# Patient Record
Sex: FEMALE | Race: WHITE | NOT HISPANIC OR LATINO | Employment: OTHER | URBAN - METROPOLITAN AREA
[De-identification: names, ages, dates, MRNs, and addresses within clinical notes are randomized per-mention and may not be internally consistent; named-entity substitution may affect disease eponyms.]

---

## 2017-05-01 ENCOUNTER — HISTORICAL (OUTPATIENT)
Dept: LAB | Facility: HOSPITAL | Age: 67
End: 2017-05-01

## 2017-05-01 LAB
ABS NEUT (OLG): 4.7 X10(3)/MCL (ref 2.1–9.2)
ALBUMIN SERPL-MCNC: 3.9 GM/DL (ref 3.4–5)
ALBUMIN/GLOB SERPL: 1.2 RATIO (ref 1.1–2)
ALP SERPL-CCNC: 80 UNIT/L (ref 46–116)
ALT SERPL-CCNC: 45 UNIT/L (ref 12–78)
APPEARANCE, UA: ABNORMAL
AST SERPL-CCNC: 24 UNIT/L (ref 15–37)
BACTERIA SPEC CULT: ABNORMAL /HPF
BASOPHILS NFR BLD AUTO: 0 % (ref 0–2)
BILIRUB SERPL-MCNC: 0.6 MG/DL (ref 0.2–1)
BILIRUB UR QL STRIP: NEGATIVE
BILIRUBIN DIRECT+TOT PNL SERPL-MCNC: 0.16 MG/DL (ref 0–0.2)
BILIRUBIN DIRECT+TOT PNL SERPL-MCNC: 0.44 MG/DL (ref 0–0.8)
BNP BLD-MCNC: 55 PG/ML (ref 0–125)
BUN SERPL-MCNC: 17 MG/DL (ref 7–18)
CALCIUM SERPL-MCNC: 9.7 MG/DL (ref 8.5–10.1)
CHLORIDE SERPL-SCNC: 103 MMOL/L (ref 98–107)
CHOLEST SERPL-MCNC: 200 MG/DL (ref 0–200)
CHOLEST/HDLC SERPL: 3 {RATIO} (ref 0–4)
CO2 SERPL-SCNC: 34.4 MMOL/L (ref 21–32)
COLOR UR: ABNORMAL
CREAT SERPL-MCNC: 1.11 MG/DL (ref 0.6–1.3)
DEPRECATED CALCIDIOL+CALCIFEROL SERPL-MC: 17.53 NG/ML (ref 30–80)
EOSINOPHIL # BLD AUTO: 0.2 X10(3)/MCL
EOSINOPHIL NFR BLD AUTO: 3 %
ERYTHROCYTE [DISTWIDTH] IN BLOOD BY AUTOMATED COUNT: 15.7 % (ref 11.5–17)
GLOBULIN SER-MCNC: 3.3 GM/DL (ref 2.4–3.5)
GLUCOSE (UA): NEGATIVE
GLUCOSE SERPL-MCNC: 123 MG/DL (ref 74–106)
HCT VFR BLD AUTO: 37.1 % (ref 37–47)
HDLC SERPL-MCNC: 66 MG/DL (ref 40–60)
HGB BLD-MCNC: 12.2 GM/DL (ref 12–16)
HGB UR QL STRIP: NEGATIVE
KETONES UR QL STRIP: NEGATIVE
LDLC SERPL CALC-MCNC: 108 MG/DL (ref 0–129)
LEUKOCYTE ESTERASE UR QL STRIP: NEGATIVE
LYMPHOCYTES # BLD AUTO: 1.6 X10(3)/MCL
LYMPHOCYTES NFR BLD AUTO: 22 % (ref 13–40)
MCH RBC QN AUTO: 28.3 PG (ref 27–31)
MCHC RBC AUTO-ENTMCNC: 32.8 GM/DL (ref 33–36)
MCV RBC AUTO: 86.2 FL (ref 80–94)
MONOCYTES # BLD AUTO: 0.8 X10(3)/MCL
MONOCYTES NFR BLD AUTO: 11 % (ref 2–11)
NEUTROPHILS # BLD AUTO: 4.7 X10(3)/MCL (ref 2.1–9.2)
NEUTROPHILS NFR BLD AUTO: 63 % (ref 47–80)
NITRITE UR QL STRIP: NEGATIVE
PH UR STRIP: 7 [PH] (ref 5–9)
PLATELET # BLD AUTO: 271 X10(3)/MCL (ref 130–400)
PMV BLD AUTO: 9.4 FL (ref 7.4–10.4)
POTASSIUM SERPL-SCNC: 4.2 MMOL/L (ref 3.5–5.1)
PROT SERPL-MCNC: 7.2 GM/DL (ref 6.4–8.2)
PROT UR QL STRIP: ABNORMAL
RBC # BLD AUTO: 4.31 X10(6)/MCL (ref 4.2–5.4)
RBC #/AREA URNS HPF: ABNORMAL /[HPF]
SODIUM SERPL-SCNC: 144 MMOL/L (ref 136–145)
SP GR UR STRIP: 1.02 (ref 1–1.03)
SQUAMOUS EPITHELIAL, UA: 26 /HPF (ref 0–4)
TRIGL SERPL-MCNC: 131 MG/DL
TSH SERPL-ACNC: 0.51 MIU/ML (ref 0.36–3.74)
UROBILINOGEN UR STRIP-ACNC: 1
VLDLC SERPL CALC-MCNC: 26 MG/DL
WBC # SPEC AUTO: 7.3 X10(3)/MCL (ref 4.5–11.5)
WBC #/AREA URNS HPF: ABNORMAL /[HPF]

## 2017-05-03 LAB — FINAL CULTURE: NORMAL

## 2017-06-02 ENCOUNTER — HISTORICAL (OUTPATIENT)
Dept: LAB | Facility: HOSPITAL | Age: 67
End: 2017-06-02

## 2017-06-02 LAB
BUN SERPL-MCNC: 17 MG/DL (ref 7–18)
CALCIUM SERPL-MCNC: 9.5 MG/DL (ref 8.5–10.1)
CHLORIDE SERPL-SCNC: 102 MMOL/L (ref 98–107)
CO2 SERPL-SCNC: 31.7 MMOL/L (ref 21–32)
CREAT SERPL-MCNC: 1.77 MG/DL (ref 0.6–1.3)
GLUCOSE SERPL-MCNC: 134 MG/DL (ref 74–106)
POTASSIUM SERPL-SCNC: 3.5 MMOL/L (ref 3.5–5.1)
SODIUM SERPL-SCNC: 141 MMOL/L (ref 136–145)

## 2017-06-15 ENCOUNTER — HISTORICAL (OUTPATIENT)
Dept: LAB | Facility: HOSPITAL | Age: 67
End: 2017-06-15

## 2017-06-15 LAB
BUN SERPL-MCNC: 13 MG/DL (ref 7–18)
CALCIUM SERPL-MCNC: 9.3 MG/DL (ref 8.5–10.1)
CHLORIDE SERPL-SCNC: 104 MMOL/L (ref 98–107)
CO2 SERPL-SCNC: 35 MMOL/L (ref 21–32)
CREAT SERPL-MCNC: 1.13 MG/DL (ref 0.6–1.3)
GLUCOSE SERPL-MCNC: 117 MG/DL (ref 74–106)
POTASSIUM SERPL-SCNC: 4.6 MMOL/L (ref 3.5–5.1)
SODIUM SERPL-SCNC: 144 MMOL/L (ref 136–145)

## 2017-07-28 ENCOUNTER — HISTORICAL (OUTPATIENT)
Dept: LAB | Facility: HOSPITAL | Age: 67
End: 2017-07-28

## 2017-07-28 LAB
ABS NEUT (OLG): 3.41 X10(3)/MCL (ref 2.1–9.2)
APTT PPP: 29.7 SECOND(S) (ref 20.6–36)
BASOPHILS # BLD AUTO: 0.1 X10(3)/MCL (ref 0–0.2)
BASOPHILS NFR BLD AUTO: 1 %
BUN SERPL-MCNC: 11 MG/DL (ref 7–18)
CALCIUM SERPL-MCNC: 9.7 MG/DL (ref 8.5–10.1)
CHLORIDE SERPL-SCNC: 102 MMOL/L (ref 98–107)
CO2 SERPL-SCNC: 31 MMOL/L (ref 21–32)
CREAT SERPL-MCNC: 1.21 MG/DL (ref 0.55–1.02)
EOSINOPHIL # BLD AUTO: 0.2 X10(3)/MCL (ref 0–0.9)
EOSINOPHIL NFR BLD AUTO: 3 %
ERYTHROCYTE [DISTWIDTH] IN BLOOD BY AUTOMATED COUNT: 13.7 % (ref 11.5–17)
GLUCOSE SERPL-MCNC: 159 MG/DL (ref 74–106)
HCT VFR BLD AUTO: 41.9 % (ref 37–47)
HGB BLD-MCNC: 13.1 GM/DL (ref 12–16)
INR PPP: 1.23 (ref 0–1.27)
LYMPHOCYTES # BLD AUTO: 1.6 X10(3)/MCL (ref 0.6–4.6)
LYMPHOCYTES NFR BLD AUTO: 26 %
MCH RBC QN AUTO: 27.7 PG (ref 27–31)
MCHC RBC AUTO-ENTMCNC: 31.3 GM/DL (ref 33–36)
MCV RBC AUTO: 88.6 FL (ref 80–94)
MONOCYTES # BLD AUTO: 0.7 X10(3)/MCL (ref 0.1–1.3)
MONOCYTES NFR BLD AUTO: 12 %
NEUTROPHILS # BLD AUTO: 3.41 X10(3)/MCL (ref 1.4–7.9)
NEUTROPHILS NFR BLD AUTO: 57 %
PLATELET # BLD AUTO: 313 X10(3)/MCL (ref 130–400)
PMV BLD AUTO: 10.8 FL (ref 9.4–12.4)
POTASSIUM SERPL-SCNC: 4 MMOL/L (ref 3.5–5.1)
PROTHROMBIN TIME: 15.3 SECOND(S) (ref 12.1–14.2)
RBC # BLD AUTO: 4.73 X10(6)/MCL (ref 4.2–5.4)
SODIUM SERPL-SCNC: 142 MMOL/L (ref 136–145)
WBC # SPEC AUTO: 5.9 X10(3)/MCL (ref 4.5–11.5)

## 2017-08-04 ENCOUNTER — HISTORICAL (OUTPATIENT)
Dept: CARDIOLOGY | Facility: HOSPITAL | Age: 67
End: 2017-08-04

## 2017-08-21 ENCOUNTER — HISTORICAL (OUTPATIENT)
Dept: RESPIRATORY THERAPY | Facility: HOSPITAL | Age: 67
End: 2017-08-21

## 2017-09-05 ENCOUNTER — HISTORICAL (OUTPATIENT)
Dept: LAB | Facility: HOSPITAL | Age: 67
End: 2017-09-05

## 2017-09-05 LAB
BUN SERPL-MCNC: 13.1 MG/DL (ref 7–18)
CALCIUM SERPL-MCNC: 9.5 MG/DL (ref 8.5–10.1)
CHLORIDE SERPL-SCNC: 106 MMOL/L (ref 98–107)
CO2 SERPL-SCNC: 33.2 MMOL/L (ref 21–32)
CREAT SERPL-MCNC: 1.1 MG/DL (ref 0.6–1.3)
GLUCOSE SERPL-MCNC: 126 MG/DL (ref 74–106)
POTASSIUM SERPL-SCNC: 4.2 MMOL/L (ref 3.5–5.1)
SODIUM SERPL-SCNC: 143 MMOL/L (ref 136–145)

## 2017-11-03 ENCOUNTER — HISTORICAL (OUTPATIENT)
Dept: LAB | Facility: HOSPITAL | Age: 67
End: 2017-11-03

## 2017-11-03 LAB
ABS NEUT (OLG): 2.7 X10(3)/MCL (ref 2.1–9.2)
ALBUMIN SERPL-MCNC: 3.9 GM/DL (ref 3.4–5)
ALBUMIN/GLOB SERPL: 1.3 RATIO (ref 1.1–2)
ALP SERPL-CCNC: 78 UNIT/L (ref 46–116)
ALT SERPL-CCNC: 26 UNIT/L (ref 12–78)
AST SERPL-CCNC: 20 UNIT/L (ref 15–37)
BASOPHILS NFR BLD AUTO: 1 % (ref 0–2)
BILIRUB SERPL-MCNC: 0.7 MG/DL (ref 0.2–1)
BILIRUBIN DIRECT+TOT PNL SERPL-MCNC: 0.19 MG/DL (ref 0–0.2)
BILIRUBIN DIRECT+TOT PNL SERPL-MCNC: 0.51 MG/DL (ref 0–0.8)
BUN SERPL-MCNC: 10.8 MG/DL (ref 7–18)
CALCIUM SERPL-MCNC: 9.9 MG/DL (ref 8.5–10.1)
CHLORIDE SERPL-SCNC: 102 MMOL/L (ref 98–107)
CHOLEST SERPL-MCNC: 154 MG/DL (ref 0–200)
CHOLEST/HDLC SERPL: 3.8 {RATIO} (ref 0–4)
CO2 SERPL-SCNC: 32.5 MMOL/L (ref 21–32)
CREAT SERPL-MCNC: 1.03 MG/DL (ref 0.6–1.3)
DEPRECATED CALCIDIOL+CALCIFEROL SERPL-MC: 27.01 NG/ML (ref 30–80)
EOSINOPHIL # BLD AUTO: 0.3 X10(3)/MCL
EOSINOPHIL NFR BLD AUTO: 5 %
ERYTHROCYTE [DISTWIDTH] IN BLOOD BY AUTOMATED COUNT: 15.3 % (ref 11.5–17)
EST. AVERAGE GLUCOSE BLD GHB EST-MCNC: 123 MG/DL
GLOBULIN SER-MCNC: 3.1 GM/DL (ref 2.4–3.5)
GLUCOSE SERPL-MCNC: 112 MG/DL (ref 74–106)
HBA1C MFR BLD: 5.9 % (ref 4.5–6.2)
HCT VFR BLD AUTO: 35.7 % (ref 37–47)
HDLC SERPL-MCNC: 41 MG/DL (ref 40–60)
HGB BLD-MCNC: 11.7 GM/DL (ref 12–16)
LDLC SERPL CALC-MCNC: 80 MG/DL (ref 0–129)
LYMPHOCYTES # BLD AUTO: 1.9 X10(3)/MCL
LYMPHOCYTES NFR BLD AUTO: 34 % (ref 13–40)
MCH RBC QN AUTO: 27.3 PG (ref 27–31)
MCHC RBC AUTO-ENTMCNC: 32.7 GM/DL (ref 33–36)
MCV RBC AUTO: 83.5 FL (ref 80–94)
MONOCYTES # BLD AUTO: 0.6 X10(3)/MCL
MONOCYTES NFR BLD AUTO: 11 % (ref 2–11)
NEUTROPHILS # BLD AUTO: 2.7 X10(3)/MCL (ref 2.1–9.2)
NEUTROPHILS NFR BLD AUTO: 49 % (ref 47–80)
PLATELET # BLD AUTO: 264 X10(3)/MCL (ref 130–400)
PMV BLD AUTO: 8.9 FL (ref 7.4–10.4)
POTASSIUM SERPL-SCNC: 4.1 MMOL/L (ref 3.5–5.1)
PROT SERPL-MCNC: 7 GM/DL (ref 6.4–8.2)
RBC # BLD AUTO: 4.28 X10(6)/MCL (ref 4.2–5.4)
SODIUM SERPL-SCNC: 143 MMOL/L (ref 136–145)
TRIGL SERPL-MCNC: 163 MG/DL
TSH SERPL-ACNC: 0.36 MIU/ML (ref 0.36–3.74)
VLDLC SERPL CALC-MCNC: 33 MG/DL
WBC # SPEC AUTO: 5.6 X10(3)/MCL (ref 4.5–11.5)

## 2018-07-05 ENCOUNTER — HISTORICAL (OUTPATIENT)
Dept: LAB | Facility: HOSPITAL | Age: 68
End: 2018-07-05

## 2018-07-05 LAB
ABS NEUT (OLG): 5 X10(3)/MCL (ref 2.1–9.2)
ALBUMIN SERPL-MCNC: 3.9 GM/DL (ref 3.4–5)
ALBUMIN/GLOB SERPL: 1.1 RATIO (ref 1.1–2)
ALP SERPL-CCNC: 101 UNIT/L (ref 46–116)
ALT SERPL-CCNC: 29 UNIT/L (ref 12–78)
AST SERPL-CCNC: 21 UNIT/L (ref 15–37)
BASOPHILS # BLD AUTO: 0.1 X10(3)/MCL
BASOPHILS NFR BLD AUTO: 1 % (ref 0–2)
BILIRUB SERPL-MCNC: 0.6 MG/DL (ref 0.2–1)
BILIRUBIN DIRECT+TOT PNL SERPL-MCNC: 0.16 MG/DL (ref 0–0.2)
BILIRUBIN DIRECT+TOT PNL SERPL-MCNC: 0.44 MG/DL (ref 0–0.8)
BUN SERPL-MCNC: 12.4 MG/DL (ref 7–18)
CALCIUM SERPL-MCNC: 9.7 MG/DL (ref 8.5–10.1)
CHLORIDE SERPL-SCNC: 106 MMOL/L (ref 98–107)
CHOLEST SERPL-MCNC: 193 MG/DL (ref 0–200)
CHOLEST/HDLC SERPL: 3.8 {RATIO} (ref 0–4)
CO2 SERPL-SCNC: 34.6 MMOL/L (ref 21–32)
CREAT SERPL-MCNC: 1.12 MG/DL (ref 0.6–1.3)
DEPRECATED CALCIDIOL+CALCIFEROL SERPL-MC: 25 NG/ML (ref 30–80)
EOSINOPHIL # BLD AUTO: 0.4 X10(3)/MCL
EOSINOPHIL NFR BLD AUTO: 4 %
ERYTHROCYTE [DISTWIDTH] IN BLOOD BY AUTOMATED COUNT: 15 % (ref 11.5–17)
GLOBULIN SER-MCNC: 3.6 GM/DL (ref 2.4–3.5)
GLUCOSE SERPL-MCNC: 95 MG/DL (ref 74–106)
HCT VFR BLD AUTO: 39.2 % (ref 37–47)
HDLC SERPL-MCNC: 51 MG/DL (ref 40–60)
HGB BLD-MCNC: 12.9 GM/DL (ref 12–16)
LDLC SERPL CALC-MCNC: 109 MG/DL (ref 0–129)
LYMPHOCYTES # BLD AUTO: 2.1 X10(3)/MCL
LYMPHOCYTES NFR BLD AUTO: 25 % (ref 13–40)
MCH RBC QN AUTO: 27.8 PG (ref 27–31)
MCHC RBC AUTO-ENTMCNC: 32.9 GM/DL (ref 33–36)
MCV RBC AUTO: 84.4 FL (ref 80–94)
MONOCYTES # BLD AUTO: 0.8 X10(3)/MCL
MONOCYTES NFR BLD AUTO: 10 % (ref 2–11)
NEUTROPHILS # BLD AUTO: 5 X10(3)/MCL (ref 2.1–9.2)
NEUTROPHILS NFR BLD AUTO: 60 % (ref 47–80)
PLATELET # BLD AUTO: 241 X10(3)/MCL (ref 130–400)
PMV BLD AUTO: 9.7 FL (ref 7.4–10.4)
POTASSIUM SERPL-SCNC: 4.7 MMOL/L (ref 3.5–5.1)
PROT SERPL-MCNC: 7.5 GM/DL (ref 6.4–8.2)
RBC # BLD AUTO: 4.65 X10(6)/MCL (ref 4.2–5.4)
SODIUM SERPL-SCNC: 144 MMOL/L (ref 136–145)
TRIGL SERPL-MCNC: 167 MG/DL
TSH SERPL-ACNC: 0.52 MIU/ML (ref 0.36–3.74)
VIT B12 SERPL-MCNC: >2000 PG/ML (ref 193–986)
VLDLC SERPL CALC-MCNC: 33 MG/DL
WBC # SPEC AUTO: 8.4 X10(3)/MCL (ref 4.5–11.5)

## 2018-09-18 ENCOUNTER — HISTORICAL (OUTPATIENT)
Dept: RADIOLOGY | Facility: HOSPITAL | Age: 68
End: 2018-09-18

## 2018-10-04 ENCOUNTER — HISTORICAL (OUTPATIENT)
Dept: RADIOLOGY | Facility: HOSPITAL | Age: 68
End: 2018-10-04

## 2018-10-16 ENCOUNTER — HISTORICAL (OUTPATIENT)
Dept: PREADMISSION TESTING | Facility: HOSPITAL | Age: 68
End: 2018-10-16

## 2018-10-16 ENCOUNTER — HISTORICAL (OUTPATIENT)
Dept: LAB | Facility: HOSPITAL | Age: 68
End: 2018-10-16

## 2018-10-16 LAB
BUN SERPL-MCNC: 14 MG/DL (ref 7–18)
CALCIUM SERPL-MCNC: 9.2 MG/DL (ref 8.5–10.1)
CHLORIDE SERPL-SCNC: 109 MMOL/L (ref 98–107)
CO2 SERPL-SCNC: 29 MMOL/L (ref 21–32)
CREAT SERPL-MCNC: 0.89 MG/DL (ref 0.55–1.02)
CREAT/UREA NIT SERPL: 15.7
GLUCOSE SERPL-MCNC: 77 MG/DL (ref 74–106)
POTASSIUM SERPL-SCNC: 4.7 MMOL/L (ref 3.5–5.1)
SODIUM SERPL-SCNC: 145 MMOL/L (ref 136–145)

## 2018-10-22 ENCOUNTER — HISTORICAL (OUTPATIENT)
Dept: SURGERY | Facility: HOSPITAL | Age: 68
End: 2018-10-22

## 2018-12-21 ENCOUNTER — HISTORICAL (OUTPATIENT)
Dept: RESPIRATORY THERAPY | Facility: HOSPITAL | Age: 68
End: 2018-12-21

## 2019-02-05 ENCOUNTER — HISTORICAL (OUTPATIENT)
Dept: LAB | Facility: HOSPITAL | Age: 69
End: 2019-02-05

## 2019-02-05 LAB
BUN SERPL-MCNC: 17.5 MG/DL (ref 7–18)
CALCIUM SERPL-MCNC: 9.5 MG/DL (ref 8.5–10.1)
CHLORIDE SERPL-SCNC: 104 MMOL/L (ref 98–107)
CO2 SERPL-SCNC: 30.3 MMOL/L (ref 21–32)
CREAT SERPL-MCNC: 1.28 MG/DL (ref 0.6–1.3)
CREAT/UREA NIT SERPL: 14
GLUCOSE SERPL-MCNC: 191 MG/DL (ref 74–106)
POTASSIUM SERPL-SCNC: 4.1 MMOL/L (ref 3.5–5.1)
SODIUM SERPL-SCNC: 141 MMOL/L (ref 136–145)

## 2019-04-26 ENCOUNTER — HISTORICAL (OUTPATIENT)
Dept: LAB | Facility: HOSPITAL | Age: 69
End: 2019-04-26

## 2019-04-26 LAB
ALBUMIN SERPL-MCNC: 3.8 GM/DL (ref 3.4–5)
ALBUMIN/GLOB SERPL: 1.2 RATIO (ref 1.1–2)
ALP SERPL-CCNC: 115 UNIT/L (ref 46–116)
ALT SERPL-CCNC: 39 UNIT/L (ref 12–78)
AST SERPL-CCNC: 22 UNIT/L (ref 15–37)
BILIRUB SERPL-MCNC: 0.7 MG/DL (ref 0.2–1)
BILIRUBIN DIRECT+TOT PNL SERPL-MCNC: 0.17 MG/DL (ref 0–0.2)
BILIRUBIN DIRECT+TOT PNL SERPL-MCNC: 0.53 MG/DL (ref 0–0.8)
BUN SERPL-MCNC: 15.4 MG/DL (ref 7–18)
CALCIUM SERPL-MCNC: 9.6 MG/DL (ref 8.5–10.1)
CHLORIDE SERPL-SCNC: 103 MMOL/L (ref 98–107)
CO2 SERPL-SCNC: 30.7 MMOL/L (ref 21–32)
CREAT SERPL-MCNC: 1.19 MG/DL (ref 0.6–1.3)
CRP SERPL-MCNC: <0.2 MG/DL (ref 0–0.9)
DEPRECATED CALCIDIOL+CALCIFEROL SERPL-MC: 17.09 NG/ML (ref 30–80)
EST. AVERAGE GLUCOSE BLD GHB EST-MCNC: 148 MG/DL
GLOBULIN SER-MCNC: 3.3 GM/DL (ref 2.4–3.5)
GLUCOSE SERPL-MCNC: 115 MG/DL (ref 74–106)
HBA1C MFR BLD: 6.8 % (ref 4.5–6.2)
POTASSIUM SERPL-SCNC: 3.9 MMOL/L (ref 3.5–5.1)
PROT SERPL-MCNC: 7.1 GM/DL (ref 6.4–8.2)
PTH-INTACT SERPL-MCNC: 65.5 PG/ML (ref 18.4–80.1)
SODIUM SERPL-SCNC: 142 MMOL/L (ref 136–145)
VIT B12 SERPL-MCNC: 639 PG/ML (ref 193–986)

## 2019-07-09 ENCOUNTER — HISTORICAL (OUTPATIENT)
Dept: LAB | Facility: HOSPITAL | Age: 69
End: 2019-07-09

## 2019-07-09 LAB
ALBUMIN SERPL-MCNC: 3.8 GM/DL (ref 3.4–5)
ALBUMIN/GLOB SERPL: 1.2 RATIO (ref 1.1–2)
ALP SERPL-CCNC: 100 UNIT/L (ref 46–116)
ALT SERPL-CCNC: 44 UNIT/L (ref 12–78)
AST SERPL-CCNC: 20 UNIT/L (ref 15–37)
BILIRUB SERPL-MCNC: 0.8 MG/DL (ref 0.2–1)
BILIRUBIN DIRECT+TOT PNL SERPL-MCNC: 0.23 MG/DL (ref 0–0.2)
BILIRUBIN DIRECT+TOT PNL SERPL-MCNC: 0.57 MG/DL (ref 0–0.8)
BUN SERPL-MCNC: 18.7 MG/DL (ref 7–18)
CALCIUM SERPL-MCNC: 9.7 MG/DL (ref 8.5–10.1)
CHLORIDE SERPL-SCNC: 105 MMOL/L (ref 98–107)
CHOLEST SERPL-MCNC: 165 MG/DL (ref 0–200)
CHOLEST/HDLC SERPL: 4 {RATIO} (ref 0–4)
CO2 SERPL-SCNC: 33 MMOL/L (ref 21–32)
CREAT SERPL-MCNC: 1.37 MG/DL (ref 0.6–1.3)
DEPRECATED CALCIDIOL+CALCIFEROL SERPL-MC: 30.22 NG/ML (ref 30–80)
EST. AVERAGE GLUCOSE BLD GHB EST-MCNC: 140 MG/DL
GLOBULIN SER-MCNC: 3.3 GM/DL (ref 2.4–3.5)
GLUCOSE SERPL-MCNC: 122 MG/DL (ref 74–106)
HBA1C MFR BLD: 6.5 % (ref 4.5–6.2)
HDLC SERPL-MCNC: 41 MG/DL (ref 40–60)
LDLC SERPL CALC-MCNC: 96 MG/DL (ref 0–129)
POTASSIUM SERPL-SCNC: 4.6 MMOL/L (ref 3.5–5.1)
PROT SERPL-MCNC: 7.1 GM/DL (ref 6.4–8.2)
SODIUM SERPL-SCNC: 144 MMOL/L (ref 136–145)
TRIGL SERPL-MCNC: 140 MG/DL
VLDLC SERPL CALC-MCNC: 28 MG/DL

## 2020-01-14 ENCOUNTER — HISTORICAL (OUTPATIENT)
Dept: LAB | Facility: HOSPITAL | Age: 70
End: 2020-01-14

## 2020-01-14 LAB
ALBUMIN SERPL-MCNC: 3.7 GM/DL (ref 3.4–5)
ALBUMIN/GLOB SERPL: 1.2 RATIO (ref 1.1–2)
ALP SERPL-CCNC: 92 UNIT/L (ref 46–116)
ALT SERPL-CCNC: 35 UNIT/L (ref 12–78)
AST SERPL-CCNC: 23 UNIT/L (ref 15–37)
BILIRUB SERPL-MCNC: 0.8 MG/DL (ref 0.2–1)
BILIRUBIN DIRECT+TOT PNL SERPL-MCNC: 0.25 MG/DL (ref 0–0.2)
BILIRUBIN DIRECT+TOT PNL SERPL-MCNC: 0.55 MG/DL (ref 0–0.8)
BUN SERPL-MCNC: 15.7 MG/DL (ref 7–18)
CALCIUM SERPL-MCNC: 9.5 MG/DL (ref 8.5–10.1)
CHLORIDE SERPL-SCNC: 105 MMOL/L (ref 98–107)
CHOLEST SERPL-MCNC: 176 MG/DL (ref 0–200)
CHOLEST/HDLC SERPL: 4 {RATIO} (ref 0–4)
CO2 SERPL-SCNC: 27.9 MMOL/L (ref 21–32)
CREAT SERPL-MCNC: 1.06 MG/DL (ref 0.6–1.3)
EST. AVERAGE GLUCOSE BLD GHB EST-MCNC: 140 MG/DL
GLOBULIN SER-MCNC: 3.2 GM/DL (ref 2.4–3.5)
GLUCOSE SERPL-MCNC: 115 MG/DL (ref 74–106)
HBA1C MFR BLD: 6.5 % (ref 4.5–6.2)
HDLC SERPL-MCNC: 44 MG/DL (ref 40–60)
LDLC SERPL CALC-MCNC: 92 MG/DL (ref 0–129)
POTASSIUM SERPL-SCNC: 4 MMOL/L (ref 3.5–5.1)
PROT SERPL-MCNC: 6.9 GM/DL (ref 6.4–8.2)
SODIUM SERPL-SCNC: 144 MMOL/L (ref 136–145)
TRIGL SERPL-MCNC: 198 MG/DL
VLDLC SERPL CALC-MCNC: 40 MG/DL

## 2020-12-15 ENCOUNTER — HOSPITAL ENCOUNTER (OUTPATIENT)
Dept: ONCOLOGY | Facility: HOSPITAL | Age: 70
End: 2020-12-16
Attending: INTERNAL MEDICINE | Admitting: INTERNAL MEDICINE

## 2020-12-15 LAB
ABS NEUT (OLG): 4.07 X10(3)/MCL (ref 2.1–9.2)
ALBUMIN SERPL-MCNC: 4.2 GM/DL (ref 3.4–4.8)
ALBUMIN/GLOB SERPL: 1.4 RATIO (ref 1.1–2)
ALP SERPL-CCNC: 76 UNIT/L (ref 40–150)
ALT SERPL-CCNC: 26 UNIT/L (ref 0–55)
AST SERPL-CCNC: 21 UNIT/L (ref 5–34)
BASOPHILS # BLD AUTO: 0 X10(3)/MCL (ref 0–0.2)
BASOPHILS NFR BLD AUTO: 1 %
BILIRUB SERPL-MCNC: 1 MG/DL
BILIRUBIN DIRECT+TOT PNL SERPL-MCNC: 0.4 MG/DL (ref 0–0.5)
BILIRUBIN DIRECT+TOT PNL SERPL-MCNC: 0.6 MG/DL (ref 0–0.8)
BNP BLD-MCNC: 19.4 PG/ML
BNP BLD-MCNC: 29 PG/ML (ref 0–100)
BUN SERPL-MCNC: 17.9 MG/DL (ref 9.8–20.1)
CALCIUM SERPL-MCNC: 9.6 MG/DL (ref 8.4–10.2)
CHLORIDE SERPL-SCNC: 102 MMOL/L (ref 98–107)
CO2 SERPL-SCNC: 33 MMOL/L (ref 23–31)
CREAT SERPL-MCNC: 1.12 MG/DL (ref 0.55–1.02)
EOSINOPHIL # BLD AUTO: 0.2 X10(3)/MCL (ref 0–0.9)
EOSINOPHIL NFR BLD AUTO: 4 %
ERYTHROCYTE [DISTWIDTH] IN BLOOD BY AUTOMATED COUNT: 13.5 % (ref 11.5–17)
GLOBULIN SER-MCNC: 3.1 GM/DL (ref 2.4–3.5)
GLUCOSE SERPL-MCNC: 110 MG/DL (ref 82–115)
HCT VFR BLD AUTO: 39.9 % (ref 37–47)
HGB BLD-MCNC: 12.2 GM/DL (ref 12–16)
LIPASE SERPL-CCNC: 44 U/L
LYMPHOCYTES # BLD AUTO: 2 X10(3)/MCL (ref 0.6–4.6)
LYMPHOCYTES NFR BLD AUTO: 28 %
MAGNESIUM SERPL-MCNC: 2 MG/DL (ref 1.6–2.6)
MCH RBC QN AUTO: 28.9 PG (ref 27–31)
MCHC RBC AUTO-ENTMCNC: 30.6 GM/DL (ref 33–36)
MCV RBC AUTO: 94.5 FL (ref 80–94)
MONOCYTES # BLD AUTO: 0.7 X10(3)/MCL (ref 0.1–1.3)
MONOCYTES NFR BLD AUTO: 10 %
NEUTROPHILS # BLD AUTO: 4.07 X10(3)/MCL (ref 2.1–9.2)
NEUTROPHILS NFR BLD AUTO: 57 %
PLATELET # BLD AUTO: 305 X10(3)/MCL (ref 130–400)
PMV BLD AUTO: 11.7 FL (ref 9.4–12.4)
POC TROPONIN: 0.01 NG/ML (ref 0–0.08)
POTASSIUM SERPL-SCNC: 5 MMOL/L (ref 3.5–5.1)
PROT SERPL-MCNC: 7.3 GM/DL (ref 5.8–7.6)
RBC # BLD AUTO: 4.22 X10(6)/MCL (ref 4.2–5.4)
SODIUM SERPL-SCNC: 143 MMOL/L (ref 136–145)
TROPONIN I SERPL-MCNC: 0.01 NG/ML (ref 0–0.04)
TROPONIN I SERPL-MCNC: <0.01 NG/ML (ref 0–0.04)
TSH SERPL-ACNC: 0.97 UIU/ML (ref 0.35–4.94)
WBC # SPEC AUTO: 7.1 X10(3)/MCL (ref 4.5–11.5)

## 2020-12-16 LAB
TROPONIN I SERPL-MCNC: <0.01 NG/ML (ref 0–0.04)
TROPONIN I SERPL-MCNC: <0.01 NG/ML (ref 0–0.04)

## 2020-12-18 ENCOUNTER — HISTORICAL (OUTPATIENT)
Dept: RADIOLOGY | Facility: HOSPITAL | Age: 70
End: 2020-12-18

## 2021-02-12 LAB — BMD RECOMMENDATION EXT: NORMAL

## 2021-04-20 ENCOUNTER — HISTORICAL (OUTPATIENT)
Dept: LAB | Facility: HOSPITAL | Age: 71
End: 2021-04-20

## 2021-04-20 LAB
EST. AVERAGE GLUCOSE BLD GHB EST-MCNC: 128.4 MG/DL
HBA1C MFR BLD: 6.1 %

## 2021-09-21 ENCOUNTER — HISTORICAL (OUTPATIENT)
Dept: ADMINISTRATIVE | Facility: HOSPITAL | Age: 71
End: 2021-09-21

## 2021-10-07 ENCOUNTER — HISTORICAL (OUTPATIENT)
Dept: LAB | Facility: HOSPITAL | Age: 71
End: 2021-10-07

## 2021-10-07 LAB
ABS NEUT (OLG): 5.13 X10(3)/MCL (ref 2.1–9.2)
ALBUMIN SERPL-MCNC: 3.8 GM/DL (ref 3.4–4.8)
ALBUMIN/GLOB SERPL: 1.1 RATIO (ref 1.1–2)
ALP SERPL-CCNC: 65 UNIT/L (ref 40–150)
ALT SERPL-CCNC: 16 UNIT/L (ref 0–55)
APPEARANCE, UA: CLEAR
AST SERPL-CCNC: 12 UNIT/L (ref 5–34)
BACTERIA SPEC CULT: ABNORMAL
BASOPHILS # BLD AUTO: 0 X10(3)/MCL (ref 0–0.2)
BASOPHILS NFR BLD AUTO: 0 %
BILIRUB SERPL-MCNC: 0.8 MG/DL
BILIRUB UR QL STRIP: NEGATIVE
BILIRUBIN DIRECT+TOT PNL SERPL-MCNC: 0.3 MG/DL (ref 0–0.5)
BILIRUBIN DIRECT+TOT PNL SERPL-MCNC: 0.5 MG/DL (ref 0–0.8)
BUN SERPL-MCNC: 17.8 MG/DL (ref 9.8–20.1)
CALCIUM SERPL-MCNC: 10 MG/DL (ref 8.4–10.2)
CHLORIDE SERPL-SCNC: 104 MMOL/L (ref 98–107)
CHOLEST SERPL-MCNC: 153 MG/DL
CHOLEST/HDLC SERPL: 4 {RATIO} (ref 0–5)
CO2 SERPL-SCNC: 28 MMOL/L (ref 23–31)
COLOR UR: YELLOW
CREAT SERPL-MCNC: 1.53 MG/DL (ref 0.55–1.02)
DEPRECATED CALCIDIOL+CALCIFEROL SERPL-MC: 56.5 NG/ML (ref 30–80)
EOSINOPHIL # BLD AUTO: 0.3 X10(3)/MCL (ref 0–0.9)
EOSINOPHIL NFR BLD AUTO: 3 %
ERYTHROCYTE [DISTWIDTH] IN BLOOD BY AUTOMATED COUNT: 13.4 % (ref 11.5–17)
EST. AVERAGE GLUCOSE BLD GHB EST-MCNC: 131.2 MG/DL
GLOBULIN SER-MCNC: 3.6 GM/DL (ref 2.4–3.5)
GLUCOSE (UA): NEGATIVE
GLUCOSE SERPL-MCNC: 121 MG/DL (ref 82–115)
HBA1C MFR BLD: 6.2 %
HCT VFR BLD AUTO: 39.7 % (ref 37–47)
HDLC SERPL-MCNC: 39 MG/DL (ref 35–60)
HGB BLD-MCNC: 12.3 GM/DL (ref 12–16)
HGB UR QL STRIP: NEGATIVE
IMM GRANULOCYTES # BLD AUTO: 0.01 % (ref 0–0.02)
IMM GRANULOCYTES NFR BLD AUTO: 0.1 % (ref 0–0.43)
KETONES UR QL STRIP: NEGATIVE
LDLC SERPL CALC-MCNC: 89 MG/DL (ref 50–140)
LEUKOCYTE ESTERASE UR QL STRIP: NEGATIVE
LYMPHOCYTES # BLD AUTO: 1.8 X10(3)/MCL (ref 0.6–4.6)
LYMPHOCYTES NFR BLD AUTO: 22 %
MCH RBC QN AUTO: 28.6 PG (ref 27–31)
MCHC RBC AUTO-ENTMCNC: 31 GM/DL (ref 33–36)
MCV RBC AUTO: 92.3 FL (ref 80–94)
MONOCYTES # BLD AUTO: 0.7 X10(3)/MCL (ref 0.1–1.3)
MONOCYTES NFR BLD AUTO: 9 %
NEUTROPHILS # BLD AUTO: 5.13 X10(3)/MCL (ref 1.4–7.9)
NEUTROPHILS NFR BLD AUTO: 64 %
NITRITE UR QL STRIP: NEGATIVE
PH UR STRIP: 5 [PH] (ref 5–9)
PLATELET # BLD AUTO: 285 X10(3)/MCL (ref 130–400)
PMV BLD AUTO: 11 FL (ref 9.4–12.4)
POTASSIUM SERPL-SCNC: 4 MMOL/L (ref 3.5–5.1)
PROT SERPL-MCNC: 7.4 GM/DL (ref 5.8–7.6)
PROT UR QL STRIP: NEGATIVE
RBC # BLD AUTO: 4.3 X10(6)/MCL (ref 4.2–5.4)
RBC #/AREA URNS HPF: ABNORMAL /[HPF]
SODIUM SERPL-SCNC: 144 MMOL/L (ref 136–145)
SP GR UR STRIP: 1.02 (ref 1–1.03)
SQUAMOUS EPITHELIAL, UA: ABNORMAL
TRIGL SERPL-MCNC: 124 MG/DL (ref 37–140)
TSH SERPL-ACNC: 0.56 UIU/ML (ref 0.35–4.94)
UROBILINOGEN UR STRIP-ACNC: 0.2
VIT B12 SERPL-MCNC: 1003 PG/ML (ref 213–816)
VLDLC SERPL CALC-MCNC: 25 MG/DL
WBC # SPEC AUTO: 8 X10(3)/MCL (ref 4.5–11.5)
WBC #/AREA URNS HPF: ABNORMAL /[HPF]

## 2021-10-19 ENCOUNTER — HISTORICAL (OUTPATIENT)
Dept: ADMINISTRATIVE | Facility: HOSPITAL | Age: 71
End: 2021-10-19

## 2022-02-16 ENCOUNTER — HISTORICAL (OUTPATIENT)
Dept: LAB | Facility: HOSPITAL | Age: 72
End: 2022-02-16

## 2022-02-16 ENCOUNTER — HISTORICAL (OUTPATIENT)
Dept: ADMINISTRATIVE | Facility: HOSPITAL | Age: 72
End: 2022-02-16

## 2022-02-16 LAB
EST. AVERAGE GLUCOSE BLD GHB EST-MCNC: 131.2 MG/DL
HBA1C MFR BLD: 6.2 %

## 2022-03-02 LAB — BCS RECOMMENDATION EXT: NORMAL

## 2022-04-10 ENCOUNTER — HISTORICAL (OUTPATIENT)
Dept: ADMINISTRATIVE | Facility: HOSPITAL | Age: 72
End: 2022-04-10
Payer: MEDICARE

## 2022-04-11 ENCOUNTER — HISTORICAL (OUTPATIENT)
Dept: ADMINISTRATIVE | Facility: HOSPITAL | Age: 72
End: 2022-04-11
Payer: MEDICARE

## 2022-04-28 VITALS
SYSTOLIC BLOOD PRESSURE: 116 MMHG | OXYGEN SATURATION: 98 % | DIASTOLIC BLOOD PRESSURE: 52 MMHG | WEIGHT: 201.94 LBS | HEIGHT: 65 IN | BODY MASS INDEX: 33.65 KG/M2

## 2022-04-28 VITALS
HEIGHT: 65 IN | BODY MASS INDEX: 33.65 KG/M2 | DIASTOLIC BLOOD PRESSURE: 52 MMHG | WEIGHT: 201.94 LBS | SYSTOLIC BLOOD PRESSURE: 116 MMHG | OXYGEN SATURATION: 98 %

## 2022-04-30 NOTE — OP NOTE
Patient:   Melinda Riggs            MRN: [id number]            FIN: [id number]               Age:   70 years     Sex:  Female     :  1950   Associated Diagnoses:   None   Author:   Rafael Alexander MD      Preoperative Diagnosis:  Cataract Right  eye    Postoperative Diagnosis:  Cataract Right eye    Procedure:  Phacoemulsification with intraocular lens implantation Right  eye    Surgeon:  Rafael Alexander MD    Assistant:  EMY Carrillo     Anestheisa:  MAC    Complications:  None    The patient was brought to the [facility] laser.  A time out was performed.  The capsulotomy, lens fragmentation and limbal relaxing incisions were completed.  Then the patient was brought into the operating suite, where the patient was correctly identified as was the operative eye via timeout.  The patient was prepped and draped in a sterile ophthalmic fashion.  A lid speculum was placed in the operative eye and the microscope was brought into place.  A 1.0 mm paracentesis was then made at (_12_) o'clock.  The anterior chamber was filled with Endocoat.  A (temporal ) clear corneal incision was made with a 2.4 mm keratome blade.  A 6 mm corneal marking ring was used to mayra the cornea centered over the visual axis.  A 5.00 mm continuous curvilinear capsulorhexis was fashioned using a cystotome and microcapsular forceps.  Hydrodissection and hydrodelineation was performed with upreserved 1% Xylocaine.  The nucleus was then phacoemulsified with the Abbott phacoemulsification hand-piece with a total of (_3_) EFX.  The cortex was then removed with the I/A hand-piece.  The lens model (_ZCB00_) with a power of (_24.5_) was placed in the capsular bag.  The Helon was then removed from the eye with the I/A hand piece.  The anterior chamber was inflated and the wounds were hydrated with BSS.  The wounds were checked with Weck-Bibi sponges and found to be watertight.  The lid speculum was removed and topical antibiotics were  placed on the operative eye.  The patient was brought to PACU in good condition.

## 2022-04-30 NOTE — OP NOTE
DATE OF SURGERY:    10/22/2018    SURGEON:  Srinivasan Butcher MD    PREOPERATIVE DIAGNOSIS:  Trigger finger, right ring finger.    POSTOPERATIVE DIAGNOSIS:  Trigger finger, right ring finger.    PROCEDURE:  Right A1 pulley release for trigger finger ring finger.    INDICATION FOR PROCEDURE:  Ms. Melinda Riggs is a 67-year-old female with longstanding history of triggering of right ring finger.  She presents for A1 pulley release.    ANESTHESIA:  MAC local.    COMPLICATIONS:  None.    PROCEDURE IN DETAIL:  The patient was placed under MAC anesthesia, and prepped and draped in the usual sterile fashion.  1% lidocaine with epinephrine was instilled into the palmar digital crease.  Tourniquet was inflated to 250 mmHg after an Esmarch was used to exsanguinate the right upper extremity.  A 1 cm incision was made using a 15-blade scalpel over the A1 pulley, and a 15-blade scalpel was used to incise the A1 pulley and tenotomy scissors used to release in its entirety.  I placed several interrupted 4-0 Monocryl stitches.  There were no complications.  I was scrubbed and present the entire procedure.  The tourniquet was released at the end of the case.  The fingers were pink.        ______________________________  Srinivasan Butcher MD    BF/UD  DD:  10/22/2018  Time:  07:53AM  DT:  10/22/2018  Time:  08:00AM  Job #:  [id number]

## 2022-04-30 NOTE — ED PROVIDER NOTES
"   Patient:   Melinda Riggs            MRN: [id number]            FIN: [id number]               Age:   70 years     Sex:  Female     :  1950   Associated Diagnoses:   Dyspnea on exertion; Acute chest pain; Acute epigastric pain   Author:   Marisol HOPE, Esau ARTHUR      Basic Information   Time seen: Date & time 12/15/2020 13:18:00.   History source: Patient.   Arrival mode: Private vehicle.   History limitation: None.   Additional information: Patient's physician(s): Yovany HOPE, Dodie VELASQUEZ, Lauren CUEVAS, Favio CORREIA, Chief Complaint from Nursing Triage Note : Chief Complaint   12/15/2020 10:55 CST     Chief Complaint           Pressure in center of chest, not all the time, not with all foods she eats, having dizzy spells, when she bends over and gets up or rolls over in bed  .      History of Present Illness   The patient presents with 69y/o F presents to the ED with chest pain. Sent here for  admission for cardiac evaluation. C;.Oneil FNSON-C and     I, Dr. Damon, assumed care of this patient at .     69 y/o female with a history of CAD, HTN, HLD, COPD and GERD, presents to the ED for complaints of epigastric/lower midline chest pain that radiates into the throat, intermittently for 2-3 weeks. Pt also reports having nausea, however, she says the pain is not associated with eating or movement. She reports that she was seen by her PCP today for her symptoms, who spoke with the pt's cardiologist, and told to come to the ED tonight and we would do an angiogram. Pt says she has had dysnpea on exertion for "a while" as well..  The onset was 3  weeks ago.  The course/duration of symptoms is fluctuating in intensity.  Location: Epigastric lower midline chest. Radiating pain: throat. The character of symptoms is "Pain".  The degree at onset was moderate.  The degree at maximum was moderate.  The degree at present is minimal.  The exacerbating factor is none.  The relieving factor is none.  Risk factors " consist of coronary artery disease, hypertension and hyperlipidemia.  Prior episodes: coronary artery disease.  Therapy today Aspirin.  Associated symptoms: shortness of breath.        Review of Systems   Constitutional symptoms:  No fever, no chills, no sweats, no weakness, no fatigue, no decreased activity.    Skin symptoms:  No jaundice, no rash, no pruritus, no abrasions, no breakdown, no burns, no dryness, no petechiae, no lesion.    Eye symptoms:  Vision unchangedNo pain, no blurred vision.    ENMT symptoms:  No ear pain, no sore throat, no nasal congestion, no sinus pain.    Respiratory symptoms:  Shortness of breathNo orthopnea, no cough, no hemoptysis, no stridor, no wheezing.    Cardiovascular symptoms:  Chest painNo palpitations, no tachycardia, no syncope, no diaphoresis, no peripheral edema.    Gastrointestinal symptoms:  Abdominal pain, epigastric, painNo nausea, no vomiting, no diarrhea, no constipation, no rectal bleeding, no rectal pain.    Genitourinary symptoms:  No dysuria, no hematuria.    Musculoskeletal symptoms:  No back pain, no Muscle pain, no Joint pain   Neurologic symptoms:  No headache, no dizziness, no altered level of consciousness, no numbness, no tingling, no weakness.              Additional review of systems information: All systems reviewed as documented in chart.      Health Status   Allergies:    Allergic Reactions (Selected)  No Known Allergies.   Medications:  (Selected)   Prescriptions  Prescribed  Norco 10 mg-325 mg oral tablet: 1 tab(s), Oral, BID, PRN PRN as needed for pain, # 20 tab(s), 0 Refill(s), Pharmacy: [facility]  Pepcid AC Maximum Strength 20 mg oral tablet: 20 mg = 1 tab(s), Oral, BID, # 180 tab(s), 0 Refill(s), Pharmacy: [facility], 162, cm, Height/Length Dosing, 07/21/20 11:58:00 CDT, 92.7, kg, Weight Dosing, 07/21/20 11:58:00 CDT  Ventolin HFA 90 mcg/inh inhalation aerosol: 2 puff(s), INH, q4hr, PRN PRN as needed for wheezing, # 1  EA, 11 Refill(s), Pharmacy: [facility]  atorvastatin 40 mg oral tablet: 40 mg = 1 tab(s), Oral, Daily, # 30 tab(s), 6 Refill(s), Pharmacy: [facility]  eszopiclone 3 mg oral tablet: See Instructions, TAKE ONE (1) TABLET BY MOUTH ONCE DAILY AT BEDTIME, # 30 mg, 5 Refill(s), Pharmacy: [facility], 162, cm, Height/Length Dosing, 07/21/20 11:58:00 CDT, 92.7, kg, Weight Dosing, 07/21/20 11:58:00 CDT  nitroglycerin 0.4 mg sublingual TAB: 0.4 mg = 1 tab(s), SL, q5min, PRN PRN for chest pain, # 60 tab(s), 0 Refill(s), Pharmacy: [facility]  omeprazole 40 mg oral DR capsule: See Instructions, TAKE ONE CAPSULE BY MOUTH EVERY DAY, # 90 cap(s), 3 Refill(s), Pharmacy: [facility], 162, cm, Height/Length Dosing, 01/14/20 9:11:00 CST, 95, kg, Weight Dosing, 01/14/20 9:11:00 CST  Documented Medications  Documented  Aleve 220 mg oral tablet: 220 mg = 1 tab(s), Oral, q8hr, 0 Refill(s)  CoQ10 300 mg oral capsule: 300 mg = 1 cap(s), Oral, Daily  Coreg 6.25 mg oral tablet: 6.25 mg = 1 tab(s), Oral, BID, 0 Refill(s)  Flonase Sensimist 27.5 mcg/inh nasal spray: Nasal, Daily, 0 Refill(s)  Lasix 40 mg oral tablet: 40 mg = 1 tab(s), Oral, Daily, 0 Refill(s)  Senokot 8.6 mg oral tablet: 8.6 mg = 1 tab(s), Oral, Once a day (at bedtime), 0 Refill(s)  Vitamin D3 5000 intl units (125 mcg) oral capsule: 5,000 IntUnit = 1 cap(s), Oral, Daily, with food, # 100 cap(s), 0 Refill(s)  aspirin 81 mg oral tablet: 81 mg = 1 tab(s), Oral, Daily, 0 Refill(s)  isosorbide DInitrate 30 mg oral tablet: 30 mg = 1 tab(s), Oral, Daily, ER, 0 Refill(s)  isosorbide MONOnitrate 30 mg oral tablet, Extended Release: 30 mg = 1 tab(s), Oral, qAM  losartan 100 mg oral tablet: 100 mg = 1 tab(s), Oral, Daily, 0 Refill(s)  traMADol 50 mg oral tablet: 50 mg = 1 tab(s), Oral, q6hr.      Past Medical/ Family/ Social History   Medical history:    Active  GERD - Gastro-esophageal reflux disease  (5010714869)  Hyperlipidemia (21834554)  Hypertension (54675444)  Arthritis (0005590)  Cervical herniated disc ([id number])  Comments:  8/29/2016 CDT 8:25 Katherin Saul  C5-6  Anxiety depression (331778675)  Shoulder pain (41622389)  Comments:  12/1/2016 CST 8:51 CST Katherin Hicks  right  Breast density ([id number])  Knee pain (69185058).   Surgical history:    Injection (0477752715) in the month of 6/2019 at 68 Years.  Comments:  7/8/2019 9:08 Rosaline Graves  left shoulder  X-ray (94777819) in 2018 at 68 Years.  Comments:  1/7/2019 9:52 Rosaline Arrieta  Chest  Release Trigger Finger Or Thumb (Right) on 10/22/2018 at 67 Years.  Comments:  10/22/2018 8:02 CDT - Emily Wong RN  auto-populated from documented surgical case  Total knee replacement (8358167668) on 9/13/2017 at 66 Years.  Comments:  11/2/2017 9:05 Katherin Saul  left knee  Repair of shoulder (192542704) on 1/6/2017 at 66 Years.  Comments:  3/15/2017 14:01 Katherin Saul  right  Mammogram (310112018) in 2016 at 66 Years.  Arthroscopy (32362949) in 2013 at 63 Years.  Comments:  3/15/2017 14:10 Katherin Saul  knee  Colonoscopy (349021856) in 2013 at 63 Years.  Hysterectomy (650360869) in 1999 at 49 Years.  Carpal tunnel decompression (0550857170) in 1993 at 43 Years.  Appendectomy (470567357) in 1991 at 41 Years.  Cholecystectomy (02300393) in 1989 at 39 Years.  Tonsillectomy (394023122) in 1960 at 10 Years.  ear drum repair in 1960 at 10 Years.  TL - Tubal ligation (756226275).  Closure of perforation of ear drum (5299289).  Cholecystectomy (83259345).  Appendectomy (439208996).  Hysterectomy (211029921).  Arthroscopic knee operation (7505203260).  Comments:  7/28/2017 9:34 Lori Ashley RN.  right  Repair of shoulder (428383684).  Comments:  7/28/2017 9:33 Lori Ashley RN.  right  CTR - Carpal tunnel release  (564933177).  Comments:  2017 9:34 YNES - Maurice RUIZ , Lori REYNOSO  bilateral  Angiogram (344215875)..   Family history:    Emphysema  Grandfather  Primary malignant neoplasm of lung  Mother ()  Coronary artery disease  Mother ()  Father ()  Acute myocardial infarction.  Father ()  Hypertension.  Mother ()  Father ()  Hyperlipidemia.  Mother ()  Father ()  , Patient states her father  of an MI at 59 years old, she states she has 2 brothers with cardiac stents done in their 50s..   Social history: Alcohol use: Occasionally, Tobacco use: Former.      Physical Examination               Vital Signs   Vital Signs   12/15/2020 10:55 CST     Peripheral Pulse Rate     65 bpm                             SpO2                      98 %                             Systolic Blood Pressure   132 mmHg                             Diastolic Blood Pressure  72 mmHg                             Blood Pressure Location   Left arm                             Manual Cuff BP            Yes                             O2 SAT at rest            98 %  .   Measurements   12/15/2020 13:23 CST     Weight Dosing             92 kg                             Weight Measured and Calculated in Lbs     202.82 lb                             Weight Estimated          92 kg                             Height/Length Dosing      167 cm                             Height/Length Estimated   167 cm                             Body Mass Index Estimated 32.99 kg/m2  .   Basic Oxygen Information   12/15/2020 10:55 CST     SpO2                      98 %  .   General:  Alert, no acute distress   Skin:  Warm, dry, no rash   Head:  Normocephalic, atraumatic   Neck:  Supple, trachea midline, no tenderness   Eye:  Extraocular movements are intact, vision unchanged   Ears, nose, mouth and throat:  Oral mucosa moist, no pharyngeal erythema or exudate.    Respiratory:  Lungs are clear to auscultation,  respirations are non-labored, breath sounds are equal, Symmetrical chest wall expansion.    Cardiovascular:  Regular rate and rhythm, No murmur, Normal peripheral perfusion   Gastrointestinal:  Soft, Nontender, Non distended, Normal bowel sounds, No organomegaly, Guarding: Negative, Rebound: Negative.    Musculoskeletal:  Normal ROM, normal strength, no tenderness, no swelling, no deformity.    Neurological:  Alert and oriented to person, place, time, and situation, No focal neurological deficit observed, CN II-XII intact, normal sensory observed, normal motor observed, normal speech observed, normal coordination observed.    Psychiatric:  Cooperative, appropriate mood & affect      Medical Decision Making   Documents reviewed:  Emergency department nurses' notes.   Orders  Launch Orders   Laboratory:  TSH (Order): Stat collect, 12/15/2020 13:25 CST, Blood, Lab Collect, Print Label By Order Location, 12/15/2020 13:25 CST  Magnesium Level (Order): Stat collect, 12/15/2020 13:25 CST, Blood, Lab Collect, Print Label By Order Location, 12/15/2020 13:25 CST  BNP (Order): Stat collect, 12/15/2020 13:25 CST, Blood, Lab Collect, Print Label By Order Location, 12/15/2020 13:25 CST  POC BNP iSTAT request (Order): Blood, Routine collect, 12/15/2020 13:25 CST by Milla Aguilar Lab Collect, Print Label By Order Location  Troponin-I (Order): Stat collect, 12/15/2020 13:25 CST, Blood, Lab Collect, Print Label By Order Location, 12/15/2020 13:25 CST  CMP (Order): Stat collect, 12/15/2020 13:25 CST, Blood, Lab Collect, Print Label By Order Location, 12/15/2020 13:25 CST  CBC w/ Auto Diff (Order): Now collect, 12/15/2020 13:24 CST, Blood, Lab Collect, Print Label By Order Location, 12/15/2020 13:24 CST  POC iSTAT ER Troponin request (Order): Blood, Stat collect, 12/15/2020 13:24 CST by Milla Aguilar Lab Collect, Print Label By Order Location  Cardiology:  EKG Adult 12 Lead (Order): 12/15/2020 13:24 CST, Stat,  Once, 12/15/2020 13:24 CST, Stretcher, Patient Has IV, Standard Precautions, -1, -1, 12/15/2020 13:24 CST.   Electrocardiogram:  Time 12/15/2020 13:21:00, rate 62, normal sinus rhythm, No ST-T changes, no ectopy, normal LA & QRS intervals, EP Interp.    Results review:  Lab results : Lab View   12/15/2020 14:42 CST     Est Creat Clearance Ser   43.33 mL/min    12/15/2020 14:14 CST     POC BNP iSTAT             29 pg/mL    12/15/2020 14:12 CST     POC Troponin              0.01 ng/mL    12/15/2020 13:32 CST     Sodium Lvl                143 mmol/L                             Potassium Lvl             5.0 mmol/L                             Chloride                  102 mmol/L                             CO2                       33 mmol/L  HI                             Calcium Lvl               9.6 mg/dL                             Magnesium Lvl             2.00 mg/dL                             Glucose Lvl               110 mg/dL                             BUN                       17.9 mg/dL                             Creatinine                1.12 mg/dL  HI                             eGFR-AA                   >60  NA                             eGFR-EMILY                  51 mL/min/1.73 m2  NA                             Bili Total                1.0 mg/dL                             Bili Direct               0.4 mg/dL                             Bili Indirect             0.60 mg/dL                             AST                       21 unit/L                             ALT                       26 unit/L                             Alk Phos                  76 unit/L                             Total Protein             7.3 gm/dL                             Albumin Lvl               4.2 gm/dL                             Globulin                  3.1 gm/dL                             A/G Ratio                 1.4 ratio                             BNP                       19.4 pg/mL                              Troponin-I                <0.010 ng/mL                             TSH                       0.9661 uIU/mL                             WBC                       7.1 x10(3)/mcL                             RBC                       4.22 x10(6)/mcL                             Hgb                       12.2 gm/dL                             Hct                       39.9 %                             Platelet                  305 x10(3)/mcL                             MCV                       94.5 fL  HI                             MCH                       28.9 pg                             MCHC                      30.6 gm/dL  LOW                             RDW                       13.5 %                             MPV                       11.7 fL                             Abs Neut                  4.07 x10(3)/mcL                             Neutro Auto               57 %  NA                             Lymph Auto                28 %  NA                             Mono Auto                 10 %  NA                             Eos Auto                  4 %  NA                             Abs Eos                   0.2 x10(3)/mcL                             Basophil Auto             1 %  NA                             Abs Neutro                4.07 x10(3)/mcL                             Abs Lymph                 2.0 x10(3)/mcL                             Abs Mono                  0.7 x10(3)/mcL                             Abs Baso                  0.0 x10(3)/mcL  .   Radiology results:  Reviewed radiologist's report, Rad Results (ST)  < 12 hrs   Accession: [id number]  Order: XR Chest 2 Views  Report Dt/Tm: 12/15/2020 14:55  Report:   Clinical History  Chest Pain     Technique  2 views of the chest.     Comparison  12/21/2018     Findings  Lungs are clear with no visualized focal airspace opacity.  The trachea appears midline.  The cardiomediastinal silhouette is within normal limits.  There is no evidence of  pneumothorax or pleural effusion.  Visualized abdomen, soft tissues, and osseous structures are  unremarkable.     Impression  No acute cardiopulmonary process.      .       Reexamination/ Reevaluation   Time: 12/15/2020 22:53:00 .   Vital signs   results included from flowsheet : Vital Signs   12/15/2020 21:00 CST     Peripheral Pulse Rate     62 bpm                             Heart Rate Monitored      63 bpm                             Respiratory Rate          18 br/min                             SpO2                      97 %                             Oxygen Therapy            Room air                             Systolic Blood Pressure   128 mmHg                             Diastolic Blood Pressure  63 mmHg                             Mean Arterial Pressure, Cuff              85 mmHg     Patient is in no apparent distress, vital signs are stable.  Patient appears comfortable.  GI cocktail had negligible effect on symptoms.    Course.      Impression and Plan   Diagnosis   Dyspnea on exertion (SJS47-FA R06.00)   Acute chest pain (FNA61-WI R07.9)   Acute epigastric pain (NEH46-VP R10.13)      Calls-Consults   -  12/15/2020 22:29:00 , Dragan LEACH.    Plan   Condition: Stable.    Disposition: Admit time  12/15/2020 22:54:00, Place in Observation Telemetry Unit.    Counseled: Patient, Regarding diagnosis, Regarding diagnostic results, Regarding treatment plan, Patient indicated understanding of instructions.    Notes: I, True Stout, acted solely as a scribe for and in the presence of Dr. Damon who performed the service. , I acknowledged that the documentation on this chart was provided by a scribe on the date of service noted above and that the documentation in the chart accurately reflects work and decisions made by me alone..

## 2022-04-30 NOTE — OP NOTE
Patient:   Melinda Riggs            MRN: [id number]            FIN: [id number]               Age:   70 years     Sex:  Female     :  1950   Associated Diagnoses:   None   Author:   Rafael Alexander MD      Preoperative Diagnosis:  Cataract Left eye    Postoperative Diagnosis:  Cataract  Left eye    Procedure:  Phacoemulsification with intraocular lens implantation Left eye    Surgeon:  Rafael Alexander MD    Assistant:  EMY Carrillo     Anestheisa:  MAC    Complications:  None    The patient was brought to the [facility] laser.  A time out was performed.  The capsulotomy, lens fragmentation and limbal relaxing incisions were completed.  Then the patient was brought into the operating suite, where the patient was correctly identified as was the operative eye via timeout.  The patient was prepped and draped in a sterile ophthalmic fashion.  A lid speculum was placed in the operative eye and the microscope was brought into place.  A 1.0 mm paracentesis was then made at (_6_) o'clock.  The anterior chamber was filled with Endocoat.  A (temporal ) clear corneal incision was made with a 2.4 mm keratome blade.  A 6 mm corneal marking ring was used to mayra the cornea centered over the visual axis.  A 5.00 mm continuous curvilinear capsulorhexis was fashioned using a cystotome and microcapsular forceps.  Hydrodissection and hydrodelineation was performed with upreserved 1% Xylocaine.  The nucleus was then phacoemulsified with the Abbott phacoemulsification hand-piece with a total of (_2_) EFX.  The cortex was then removed with the I/A hand-piece.  The lens model (_ZCB00_) with a power of (_25.0_) was placed in the capsular bag.  The Helon was then removed from the eye with the I/A hand piece.  The anterior chamber was inflated and the wounds were hydrated with BSS.  The wounds were checked with Weck-Bibi sponges and found to be watertight.  The lid speculum was removed and topical antibiotics were placed on  the operative eye.  The patient was brought to PACU in good condition.

## 2022-06-25 PROBLEM — F41.8 MIXED ANXIETY DEPRESSIVE DISORDER: Status: ACTIVE | Noted: 2022-06-25

## 2022-06-25 PROBLEM — E78.5 HYPERLIPIDEMIA: Status: ACTIVE | Noted: 2022-06-25

## 2022-06-25 PROBLEM — M19.90 ARTHRITIS: Status: ACTIVE | Noted: 2022-06-25

## 2022-06-25 PROBLEM — M25.511 ACUTE PAIN OF BOTH SHOULDERS: Status: ACTIVE | Noted: 2022-06-25

## 2022-06-25 PROBLEM — M25.512 ACUTE PAIN OF BOTH SHOULDERS: Status: ACTIVE | Noted: 2022-06-25

## 2022-06-25 PROBLEM — M25.569 KNEE PAIN: Status: ACTIVE | Noted: 2022-06-25

## 2022-06-25 PROBLEM — R05.9 COUGH: Status: ACTIVE | Noted: 2022-06-25

## 2022-06-25 PROBLEM — R06.02 SHORTNESS OF BREATH: Status: ACTIVE | Noted: 2022-06-25

## 2022-06-25 PROBLEM — Z00.00 MEDICARE ANNUAL WELLNESS VISIT, SUBSEQUENT: Status: ACTIVE | Noted: 2022-06-25

## 2022-06-25 PROBLEM — M54.9 BACK PAIN: Status: ACTIVE | Noted: 2022-06-25

## 2022-06-25 PROBLEM — R39.89 ABNORMAL URINE COLOR: Status: ACTIVE | Noted: 2022-06-25

## 2022-06-25 PROBLEM — I25.10 ARTERIOSCLEROSIS OF CORONARY ARTERY: Status: ACTIVE | Noted: 2022-06-25

## 2022-06-25 PROBLEM — I10 HYPERTENSION: Status: ACTIVE | Noted: 2022-06-25

## 2022-06-25 PROBLEM — R92.30 BREAST DENSITY: Status: ACTIVE | Noted: 2022-06-25

## 2022-06-25 PROBLEM — K21.9 GASTROESOPHAGEAL REFLUX DISEASE: Status: ACTIVE | Noted: 2022-06-25

## 2022-06-25 PROBLEM — E66.9 OBESITY: Status: ACTIVE | Noted: 2022-06-25

## 2022-06-25 PROBLEM — M50.20 HERNIATION OF INTERVERTEBRAL DISC OF CERVICAL REGION: Status: ACTIVE | Noted: 2022-06-25

## 2022-06-25 PROBLEM — J44.9 CHRONIC OBSTRUCTIVE PULMONARY DISEASE: Status: ACTIVE | Noted: 2022-06-25

## 2022-06-25 PROBLEM — R10.13 EPIGASTRIC PAIN: Status: ACTIVE | Noted: 2022-06-25

## 2022-06-25 PROBLEM — G47.30 SLEEP APNEA: Status: ACTIVE | Noted: 2022-06-25

## 2022-06-27 ENCOUNTER — LAB VISIT (OUTPATIENT)
Dept: LAB | Facility: HOSPITAL | Age: 72
End: 2022-06-27
Attending: FAMILY MEDICINE
Payer: MEDICARE

## 2022-06-27 DIAGNOSIS — E11.65 TYPE 2 DIABETES MELLITUS WITH HYPERGLYCEMIA, WITHOUT LONG-TERM CURRENT USE OF INSULIN: ICD-10-CM

## 2022-06-27 DIAGNOSIS — E55.9 VITAMIN D DEFICIENCY: ICD-10-CM

## 2022-06-27 DIAGNOSIS — E78.49 OTHER HYPERLIPIDEMIA: ICD-10-CM

## 2022-06-27 DIAGNOSIS — E03.8 OTHER SPECIFIED HYPOTHYROIDISM: ICD-10-CM

## 2022-06-27 DIAGNOSIS — I10 PRIMARY HYPERTENSION: ICD-10-CM

## 2022-06-27 DIAGNOSIS — E53.8 VITAMIN B12 DEFICIENCY: ICD-10-CM

## 2022-06-27 PROBLEM — M87.9 KNEE OSTEONECROSIS, RIGHT: Status: ACTIVE | Noted: 2017-09-13

## 2022-06-27 LAB
ALBUMIN SERPL-MCNC: 3.8 GM/DL (ref 3.4–4.8)
ALBUMIN/GLOB SERPL: 1.3 RATIO (ref 1.1–2)
ALP SERPL-CCNC: 65 UNIT/L (ref 40–150)
ALT SERPL-CCNC: 16 UNIT/L (ref 0–55)
AST SERPL-CCNC: 16 UNIT/L (ref 5–34)
BASOPHILS # BLD AUTO: 0.02 X10(3)/MCL (ref 0–0.2)
BASOPHILS NFR BLD AUTO: 0.3 %
BILIRUBIN DIRECT+TOT PNL SERPL-MCNC: 0.7 MG/DL
BUN SERPL-MCNC: 22.9 MG/DL (ref 9.8–20.1)
CALCIUM SERPL-MCNC: 10.2 MG/DL (ref 8.4–10.2)
CHLORIDE SERPL-SCNC: 105 MMOL/L (ref 98–107)
CHOLEST SERPL-MCNC: 149 MG/DL
CHOLEST/HDLC SERPL: 4 {RATIO} (ref 0–5)
CO2 SERPL-SCNC: 29 MMOL/L (ref 23–31)
CREAT SERPL-MCNC: 1.5 MG/DL (ref 0.55–1.02)
DEPRECATED CALCIDIOL+CALCIFEROL SERPL-MC: 49.8 NG/ML (ref 30–80)
EOSINOPHIL # BLD AUTO: 0.16 X10(3)/MCL (ref 0–0.9)
EOSINOPHIL NFR BLD AUTO: 2.3 %
ERYTHROCYTE [DISTWIDTH] IN BLOOD BY AUTOMATED COUNT: 13.6 % (ref 11.5–17)
EST. AVERAGE GLUCOSE BLD GHB EST-MCNC: 122.6 MG/DL
GLOBULIN SER-MCNC: 2.9 GM/DL (ref 2.4–3.5)
GLUCOSE SERPL-MCNC: 100 MG/DL (ref 82–115)
HBA1C MFR BLD: 5.9 %
HCT VFR BLD AUTO: 38.8 % (ref 37–47)
HDLC SERPL-MCNC: 37 MG/DL (ref 35–60)
HGB BLD-MCNC: 12.1 GM/DL (ref 12–16)
IMM GRANULOCYTES # BLD AUTO: 0.01 X10(3)/MCL (ref 0–0.02)
IMM GRANULOCYTES NFR BLD AUTO: 0.1 % (ref 0–0.43)
LDLC SERPL CALC-MCNC: 87 MG/DL (ref 50–140)
LYMPHOCYTES # BLD AUTO: 1.47 X10(3)/MCL (ref 0.6–4.6)
LYMPHOCYTES NFR BLD AUTO: 20.9 %
MCH RBC QN AUTO: 28.1 PG (ref 27–31)
MCHC RBC AUTO-ENTMCNC: 31.2 MG/DL (ref 33–36)
MCV RBC AUTO: 90 FL (ref 80–94)
MONOCYTES # BLD AUTO: 0.62 X10(3)/MCL (ref 0.1–1.3)
MONOCYTES NFR BLD AUTO: 8.8 %
NEUTROPHILS # BLD AUTO: 4.8 X10(3)/MCL (ref 2.1–9.2)
NEUTROPHILS NFR BLD AUTO: 67.6 %
PLATELET # BLD AUTO: 258 X10(3)/MCL (ref 130–400)
PMV BLD AUTO: 10.4 FL (ref 9.4–12.4)
POTASSIUM SERPL-SCNC: 4.5 MMOL/L (ref 3.5–5.1)
PROT SERPL-MCNC: 6.7 GM/DL (ref 5.8–7.6)
RBC # BLD AUTO: 4.31 X10(6)/MCL (ref 4.2–5.4)
SODIUM SERPL-SCNC: 142 MMOL/L (ref 136–145)
TRIGL SERPL-MCNC: 124 MG/DL (ref 37–140)
TSH SERPL-ACNC: 0.69 UIU/ML (ref 0.35–4.94)
VIT B12 SERPL-MCNC: 737 PG/ML (ref 213–816)
VLDLC SERPL CALC-MCNC: 25 MG/DL
WBC # SPEC AUTO: 7 X10(3)/MCL (ref 4.5–11.5)

## 2022-06-27 PROCEDURE — 80053 COMPREHEN METABOLIC PANEL: CPT

## 2022-06-27 PROCEDURE — 85025 COMPLETE CBC W/AUTO DIFF WBC: CPT

## 2022-06-27 PROCEDURE — 84443 ASSAY THYROID STIM HORMONE: CPT

## 2022-06-27 PROCEDURE — 82607 VITAMIN B-12: CPT

## 2022-06-27 PROCEDURE — 83036 HEMOGLOBIN GLYCOSYLATED A1C: CPT

## 2022-06-27 PROCEDURE — 36415 COLL VENOUS BLD VENIPUNCTURE: CPT

## 2022-06-27 PROCEDURE — 82306 VITAMIN D 25 HYDROXY: CPT

## 2022-06-27 PROCEDURE — 80061 LIPID PANEL: CPT

## 2022-08-05 LAB — CRC RECOMMENDATION EXT: NORMAL

## 2022-08-31 ENCOUNTER — LAB VISIT (OUTPATIENT)
Dept: LAB | Facility: HOSPITAL | Age: 72
End: 2022-08-31
Attending: ORTHOPAEDIC SURGERY
Payer: MEDICARE

## 2022-08-31 DIAGNOSIS — T84.012A: Primary | ICD-10-CM

## 2022-08-31 LAB
CRP SERPL-MCNC: 0.1 MG/L
ERYTHROCYTE [SEDIMENTATION RATE] IN BLOOD: 19 MM/HR (ref 0–20)

## 2022-08-31 PROCEDURE — 85651 RBC SED RATE NONAUTOMATED: CPT

## 2022-08-31 PROCEDURE — 86140 C-REACTIVE PROTEIN: CPT

## 2022-08-31 PROCEDURE — 36415 COLL VENOUS BLD VENIPUNCTURE: CPT

## 2022-09-06 ENCOUNTER — DOCUMENTATION ONLY (OUTPATIENT)
Dept: ADMINISTRATIVE | Facility: HOSPITAL | Age: 72
End: 2022-09-06
Payer: MEDICARE

## 2022-09-06 ENCOUNTER — PATIENT OUTREACH (OUTPATIENT)
Dept: ADMINISTRATIVE | Facility: HOSPITAL | Age: 72
End: 2022-09-06
Payer: MEDICARE

## 2022-09-06 NOTE — PROGRESS NOTES
Population Health Outreach.Records Received, hyper-linked into chart at this time. The following record(s)  below were uploaded for Health Maintenance .    8/5/2022-colonoscopy  3/2/2022-mammogram

## 2022-09-26 PROBLEM — Z00.00 MEDICARE ANNUAL WELLNESS VISIT, SUBSEQUENT: Status: RESOLVED | Noted: 2022-06-25 | Resolved: 2022-09-26

## 2022-10-02 PROBLEM — N18.32 STAGE 3B CHRONIC KIDNEY DISEASE: Status: ACTIVE | Noted: 2022-10-02

## 2022-11-29 ENCOUNTER — LAB VISIT (OUTPATIENT)
Dept: LAB | Facility: HOSPITAL | Age: 72
End: 2022-11-29
Attending: FAMILY MEDICINE
Payer: MEDICARE

## 2022-11-29 DIAGNOSIS — R10.9 RIGHT FLANK PAIN: ICD-10-CM

## 2022-11-29 LAB
ALBUMIN SERPL-MCNC: 4 GM/DL (ref 3.4–4.8)
ALBUMIN/GLOB SERPL: 1.4 RATIO (ref 1.1–2)
ALP SERPL-CCNC: 65 UNIT/L (ref 40–150)
ALT SERPL-CCNC: 15 UNIT/L (ref 0–55)
AST SERPL-CCNC: 18 UNIT/L (ref 5–34)
BILIRUBIN DIRECT+TOT PNL SERPL-MCNC: 0.6 MG/DL
BUN SERPL-MCNC: 14.3 MG/DL (ref 9.8–20.1)
CALCIUM SERPL-MCNC: 10.2 MG/DL (ref 8.4–10.2)
CHLORIDE SERPL-SCNC: 102 MMOL/L (ref 98–107)
CO2 SERPL-SCNC: 30 MMOL/L (ref 23–31)
CREAT SERPL-MCNC: 1.28 MG/DL (ref 0.55–1.02)
GFR SERPLBLD CREATININE-BSD FMLA CKD-EPI: 45 MLS/MIN/1.73/M2
GLOBULIN SER-MCNC: 2.8 GM/DL (ref 2.4–3.5)
GLUCOSE SERPL-MCNC: 81 MG/DL (ref 82–115)
POTASSIUM SERPL-SCNC: 4.5 MMOL/L (ref 3.5–5.1)
PROT SERPL-MCNC: 6.8 GM/DL (ref 5.8–7.6)
SODIUM SERPL-SCNC: 142 MMOL/L (ref 136–145)

## 2022-11-29 PROCEDURE — 80053 COMPREHEN METABOLIC PANEL: CPT

## 2022-11-29 PROCEDURE — 36415 COLL VENOUS BLD VENIPUNCTURE: CPT

## 2023-02-20 ENCOUNTER — LAB VISIT (OUTPATIENT)
Dept: LAB | Facility: HOSPITAL | Age: 73
End: 2023-02-20
Attending: FAMILY MEDICINE
Payer: MEDICARE

## 2023-02-20 DIAGNOSIS — E03.8 OTHER SPECIFIED HYPOTHYROIDISM: ICD-10-CM

## 2023-02-20 DIAGNOSIS — E11.65 TYPE 2 DIABETES MELLITUS WITH HYPERGLYCEMIA, WITHOUT LONG-TERM CURRENT USE OF INSULIN: ICD-10-CM

## 2023-02-20 DIAGNOSIS — N32.81 OAB (OVERACTIVE BLADDER): ICD-10-CM

## 2023-02-20 DIAGNOSIS — E53.8 VITAMIN B12 DEFICIENCY: ICD-10-CM

## 2023-02-20 DIAGNOSIS — I10 ESSENTIAL HYPERTENSION: ICD-10-CM

## 2023-02-20 DIAGNOSIS — E55.9 VITAMIN D DEFICIENCY: ICD-10-CM

## 2023-02-20 DIAGNOSIS — E78.49 OTHER HYPERLIPIDEMIA: ICD-10-CM

## 2023-02-20 LAB
ALBUMIN SERPL-MCNC: 3.8 G/DL (ref 3.4–4.8)
ALBUMIN/GLOB SERPL: 1.2 RATIO (ref 1.1–2)
ALP SERPL-CCNC: 74 UNIT/L (ref 40–150)
ALT SERPL-CCNC: 13 UNIT/L (ref 0–55)
APPEARANCE UR: CLEAR
AST SERPL-CCNC: 13 UNIT/L (ref 5–34)
BASOPHILS # BLD AUTO: 0.02 X10(3)/MCL (ref 0–0.2)
BASOPHILS NFR BLD AUTO: 0.4 %
BILIRUB UR QL STRIP.AUTO: ABNORMAL MG/DL
BILIRUBIN DIRECT+TOT PNL SERPL-MCNC: 0.9 MG/DL
BUN SERPL-MCNC: 12.5 MG/DL (ref 9.8–20.1)
CALCIUM SERPL-MCNC: 9.7 MG/DL (ref 8.4–10.2)
CHLORIDE SERPL-SCNC: 104 MMOL/L (ref 98–107)
CHOLEST SERPL-MCNC: 145 MG/DL
CHOLEST/HDLC SERPL: 4 {RATIO} (ref 0–5)
CO2 SERPL-SCNC: 29 MMOL/L (ref 23–31)
COLOR UR AUTO: YELLOW
CREAT SERPL-MCNC: 1.21 MG/DL (ref 0.55–1.02)
DEPRECATED CALCIDIOL+CALCIFEROL SERPL-MC: 41.3 NG/ML (ref 30–80)
EOSINOPHIL # BLD AUTO: 0.14 X10(3)/MCL (ref 0–0.9)
EOSINOPHIL NFR BLD AUTO: 2.6 %
ERYTHROCYTE [DISTWIDTH] IN BLOOD BY AUTOMATED COUNT: 12.6 % (ref 11.5–17)
EST. AVERAGE GLUCOSE BLD GHB EST-MCNC: 119.8 MG/DL
GFR SERPLBLD CREATININE-BSD FMLA CKD-EPI: 48 MLS/MIN/1.73/M2
GLOBULIN SER-MCNC: 3.2 GM/DL (ref 2.4–3.5)
GLUCOSE SERPL-MCNC: 113 MG/DL (ref 82–115)
GLUCOSE UR QL STRIP.AUTO: NEGATIVE MG/DL
HBA1C MFR BLD: 5.8 %
HCT VFR BLD AUTO: 38.7 % (ref 37–47)
HDLC SERPL-MCNC: 40 MG/DL (ref 35–60)
HGB BLD-MCNC: 12.2 G/DL (ref 12–16)
IMM GRANULOCYTES # BLD AUTO: 0 X10(3)/MCL (ref 0–0.04)
IMM GRANULOCYTES NFR BLD AUTO: 0 %
KETONES UR QL STRIP.AUTO: NEGATIVE MG/DL
LDLC SERPL CALC-MCNC: 82 MG/DL (ref 50–140)
LEUKOCYTE ESTERASE UR QL STRIP.AUTO: NEGATIVE UNIT/L
LYMPHOCYTES # BLD AUTO: 1.24 X10(3)/MCL (ref 0.6–4.6)
LYMPHOCYTES NFR BLD AUTO: 23.1 %
MCH RBC QN AUTO: 28.8 PG
MCHC RBC AUTO-ENTMCNC: 31.5 G/DL (ref 33–36)
MCV RBC AUTO: 91.3 FL (ref 80–94)
MONOCYTES # BLD AUTO: 0.54 X10(3)/MCL (ref 0.1–1.3)
MONOCYTES NFR BLD AUTO: 10.1 %
NEUTROPHILS # BLD AUTO: 3.42 X10(3)/MCL (ref 2.1–9.2)
NEUTROPHILS NFR BLD AUTO: 63.8 %
NITRITE UR QL STRIP.AUTO: NEGATIVE
PH UR STRIP.AUTO: 5.5 [PH]
PLATELET # BLD AUTO: 294 X10(3)/MCL (ref 130–400)
PMV BLD AUTO: 10.7 FL (ref 7.4–10.4)
POTASSIUM SERPL-SCNC: 3.5 MMOL/L (ref 3.5–5.1)
PROT SERPL-MCNC: 7 GM/DL (ref 5.8–7.6)
PROT UR QL STRIP.AUTO: NEGATIVE MG/DL
RBC # BLD AUTO: 4.24 X10(6)/MCL (ref 4.2–5.4)
RBC UR QL AUTO: ABNORMAL UNIT/L
SODIUM SERPL-SCNC: 142 MMOL/L (ref 136–145)
SP GR UR STRIP.AUTO: 1.02
T3FREE SERPL-MCNC: 2.84 PG/ML (ref 1.57–3.91)
T4 FREE SERPL-MCNC: 1.17 NG/DL (ref 0.7–1.48)
TRIGL SERPL-MCNC: 114 MG/DL (ref 37–140)
TSH SERPL-ACNC: 0.21 UIU/ML (ref 0.35–4.94)
UROBILINOGEN UR STRIP-ACNC: 1 MG/DL
VIT B12 SERPL-MCNC: 524 PG/ML (ref 213–816)
VLDLC SERPL CALC-MCNC: 23 MG/DL
WBC # SPEC AUTO: 5.4 X10(3)/MCL (ref 4.5–11.5)

## 2023-02-20 PROCEDURE — 84439 ASSAY OF FREE THYROXINE: CPT

## 2023-02-20 PROCEDURE — 81003 URINALYSIS AUTO W/O SCOPE: CPT

## 2023-02-20 PROCEDURE — 36415 COLL VENOUS BLD VENIPUNCTURE: CPT

## 2023-02-20 PROCEDURE — 82306 VITAMIN D 25 HYDROXY: CPT

## 2023-02-20 PROCEDURE — 80053 COMPREHEN METABOLIC PANEL: CPT

## 2023-02-20 PROCEDURE — 84443 ASSAY THYROID STIM HORMONE: CPT

## 2023-02-20 PROCEDURE — 80061 LIPID PANEL: CPT

## 2023-02-20 PROCEDURE — 85025 COMPLETE CBC W/AUTO DIFF WBC: CPT

## 2023-02-20 PROCEDURE — 84481 FREE ASSAY (FT-3): CPT

## 2023-02-20 PROCEDURE — 83036 HEMOGLOBIN GLYCOSYLATED A1C: CPT

## 2023-02-20 PROCEDURE — 82607 VITAMIN B-12: CPT

## 2023-07-31 PROBLEM — E11.65 TYPE 2 DIABETES MELLITUS WITH HYPERGLYCEMIA, WITHOUT LONG-TERM CURRENT USE OF INSULIN: Status: ACTIVE | Noted: 2023-07-31

## 2023-07-31 PROBLEM — I70.0 AORTIC ATHEROSCLEROSIS: Status: ACTIVE | Noted: 2023-07-31

## 2023-09-13 ENCOUNTER — HOSPITAL ENCOUNTER (OUTPATIENT)
Dept: RADIOLOGY | Facility: HOSPITAL | Age: 73
Discharge: HOME | End: 2023-09-13
Attending: OTOLARYNGOLOGY
Payer: MEDICARE

## 2023-09-13 DIAGNOSIS — Z01.818 OTHER SPECIFIED PRE-OPERATIVE EXAMINATION: ICD-10-CM

## 2023-09-13 DIAGNOSIS — Z79.01 LONG TERM (CURRENT) USE OF ANTICOAGULANTS: ICD-10-CM

## 2023-09-13 DIAGNOSIS — Z01.818 OTHER SPECIFIED PRE-OPERATIVE EXAMINATION: Primary | ICD-10-CM

## 2023-09-13 PROCEDURE — 71046 X-RAY EXAM CHEST 2 VIEWS: CPT | Mod: TC

## 2023-10-02 NOTE — DISCHARGE INSTRUCTIONS
Patient Education        Blepharoplasty Discharge Instructions    What will the results be?   Your eyes will look younger after a blepharoplasty. Your eyesight may be better if your baggy skin was blocking your vision.    What happens during the procedure?   Once you are in the operating room, the staff will put an IV in your arm to give you fluids and drugs. You will be given a drug to make you sleepy. It will also help you stay pain free during the surgery.  A cut will be made along the skin lines or from the inside of the eyelids. Extra fat will be removed. Any loose skin and muscle will be trimmed down. The cut site will be closed with stitches. These will dissolve and do not need to be removed. The surgery most often takes an hour or more based on how much skin and fat need to be removed.    What happens after the procedure?   The staff will watch you closely until you are ready to go home.  You may have bandages over your eyes when you wake up from the surgery. Talk to your doctor about how to care for your wounds.  You may feel ointment in your eyes. This will help with dryness.    What care is needed at home?   Wash your hands with soap and water, clean eyelid using a q-tip with warm water, then apply ointment to eyelid. Do this twice a day as instructed by your doctor. Do not use peroxide!  Your eyelids may feel tight and sore. Ice packs will help with swelling and bruising. This may last for a couple of weeks. Scars will most likely fade after a few months. Place an ice pack wrapped in a towel over the painful part. Never put ice right on the skin. Do not leave the ice on more than 10 to 15 minutes at a time.  You may shower or bathe but do not soak or saturate eyelid with water.  Wearing sunglasses and using a special sunblock for your eyelids. Your eye may be sensitive to sunlight, wind, and other irritants.  Prop your head on pillows.   You may use over the counter pain medications.    What follow-up  care is needed?   Be sure to keep your follow up visit.     What lifestyle changes are needed?   Ask your doctor when you can wear your contact lenses or make up again.  Do not rub your eyes after the surgery.    Will physical activity be limited?   Activity as tolerated    What problems could happen?   Bleeding. Some bruising or a black eye is normal and this will improve after a few days.  Facial pain  Redness, swelling, or more pain in the eyelids  Infection  Overcorrection, and eyelids do not close completely  Under correction, and eyelids are still droopy  Double vision  Eye dryness    When do I need to call the doctor?   Signs of infection. These include a fever of 100.4°F (38°C) or higher, chills, wound that will not heal.  Signs of wound infection. These include swelling, redness, warmth around the wound; too much pain when touched; yellowish, greenish, or bloody discharge; foul smell coming from the cut site; cut site opens up.

## 2023-10-04 ENCOUNTER — ANESTHESIA EVENT (OUTPATIENT)
Dept: SURGERY | Facility: HOSPITAL | Age: 73
End: 2023-10-04
Payer: MEDICARE

## 2023-10-05 ENCOUNTER — HOSPITAL ENCOUNTER (OUTPATIENT)
Facility: HOSPITAL | Age: 73
Discharge: HOME | End: 2023-10-05
Attending: OTOLARYNGOLOGY | Admitting: OTOLARYNGOLOGY
Payer: MEDICARE

## 2023-10-05 ENCOUNTER — ANESTHESIA (OUTPATIENT)
Dept: SURGERY | Facility: HOSPITAL | Age: 73
End: 2023-10-05
Payer: MEDICARE

## 2023-10-05 PROCEDURE — 71000015 HC POSTOP RECOV 1ST HR: Performed by: OTOLARYNGOLOGY

## 2023-10-05 PROCEDURE — D9220A PRA ANESTHESIA: ICD-10-PCS | Mod: ANES,,, | Performed by: ANESTHESIOLOGY

## 2023-10-05 PROCEDURE — 63600175 PHARM REV CODE 636 W HCPCS: Performed by: OTOLARYNGOLOGY

## 2023-10-05 PROCEDURE — D9220A PRA ANESTHESIA: Mod: ANES,,, | Performed by: ANESTHESIOLOGY

## 2023-10-05 PROCEDURE — 36000707: Mod: WS

## 2023-10-05 PROCEDURE — 71000016 HC POSTOP RECOV ADDL HR: Performed by: OTOLARYNGOLOGY

## 2023-10-05 PROCEDURE — 63600175 PHARM REV CODE 636 W HCPCS: Performed by: NURSE ANESTHETIST, CERTIFIED REGISTERED

## 2023-10-05 PROCEDURE — 63600175 PHARM REV CODE 636 W HCPCS: Performed by: ANESTHESIOLOGY

## 2023-10-05 PROCEDURE — D9220A PRA ANESTHESIA: ICD-10-PCS | Mod: CRNA,,, | Performed by: NURSE ANESTHETIST, CERTIFIED REGISTERED

## 2023-10-05 PROCEDURE — 36000706: Performed by: OTOLARYNGOLOGY

## 2023-10-05 PROCEDURE — 37000009 HC ANESTHESIA EA ADD 15 MINS: Performed by: OTOLARYNGOLOGY

## 2023-10-05 PROCEDURE — D9220A PRA ANESTHESIA: Mod: CRNA,,, | Performed by: NURSE ANESTHETIST, CERTIFIED REGISTERED

## 2023-10-05 PROCEDURE — 25000003 PHARM REV CODE 250: Performed by: OTOLARYNGOLOGY

## 2023-10-05 PROCEDURE — 25000003 PHARM REV CODE 250: Performed by: NURSE ANESTHETIST, CERTIFIED REGISTERED

## 2023-10-05 PROCEDURE — 37000009 HC ANESTHESIA EA ADD 15 MINS: Mod: WS

## 2023-10-05 PROCEDURE — 71000033 HC RECOVERY, INTIAL HOUR: Performed by: OTOLARYNGOLOGY

## 2023-10-05 PROCEDURE — 37000008 HC ANESTHESIA 1ST 15 MINUTES: Performed by: OTOLARYNGOLOGY

## 2023-10-05 PROCEDURE — 36000707: Performed by: OTOLARYNGOLOGY

## 2023-10-05 RX ORDER — FENTANYL CITRATE 50 UG/ML
INJECTION, SOLUTION INTRAMUSCULAR; INTRAVENOUS
Status: DISCONTINUED | OUTPATIENT
Start: 2023-10-05 | End: 2023-10-05

## 2023-10-05 RX ORDER — BACITRACIN 500 [USP'U]/G
OINTMENT TOPICAL
Status: DISCONTINUED
Start: 2023-10-05 | End: 2023-10-05 | Stop reason: HOSPADM

## 2023-10-05 RX ORDER — HYDROMORPHONE HYDROCHLORIDE 2 MG/ML
0.4 INJECTION, SOLUTION INTRAMUSCULAR; INTRAVENOUS; SUBCUTANEOUS EVERY 5 MIN PRN
Status: DISCONTINUED | OUTPATIENT
Start: 2023-10-05 | End: 2023-10-05 | Stop reason: HOSPADM

## 2023-10-05 RX ORDER — SODIUM CHLORIDE, SODIUM GLUCONATE, SODIUM ACETATE, POTASSIUM CHLORIDE AND MAGNESIUM CHLORIDE 30; 37; 368; 526; 502 MG/100ML; MG/100ML; MG/100ML; MG/100ML; MG/100ML
INJECTION, SOLUTION INTRAVENOUS CONTINUOUS
Status: DISCONTINUED | OUTPATIENT
Start: 2023-10-05 | End: 2023-10-05 | Stop reason: HOSPADM

## 2023-10-05 RX ORDER — PROMETHAZINE HYDROCHLORIDE 25 MG/ML
INJECTION, SOLUTION INTRAMUSCULAR; INTRAVENOUS
Status: DISCONTINUED | OUTPATIENT
Start: 2023-10-05 | End: 2023-10-05

## 2023-10-05 RX ORDER — EPINEPHRINE 1 MG/ML
INJECTION, SOLUTION, CONCENTRATE INTRAVENOUS
Status: DISCONTINUED | OUTPATIENT
Start: 2023-10-05 | End: 2023-10-05 | Stop reason: HOSPADM

## 2023-10-05 RX ORDER — ERYTHROMYCIN 5 MG/G
OINTMENT OPHTHALMIC
Status: DISCONTINUED
Start: 2023-10-05 | End: 2023-10-05 | Stop reason: HOSPADM

## 2023-10-05 RX ORDER — LIDOCAINE HYDROCHLORIDE AND EPINEPHRINE 5; 5 MG/ML; UG/ML
INJECTION, SOLUTION INFILTRATION; PERINEURAL
Status: DISCONTINUED | OUTPATIENT
Start: 2023-10-05 | End: 2023-10-05 | Stop reason: HOSPADM

## 2023-10-05 RX ORDER — ACETAMINOPHEN 10 MG/ML
INJECTION, SOLUTION INTRAVENOUS
Status: DISCONTINUED | OUTPATIENT
Start: 2023-10-05 | End: 2023-10-05

## 2023-10-05 RX ORDER — ONDANSETRON HYDROCHLORIDE 2 MG/ML
INJECTION, SOLUTION INTRAMUSCULAR; INTRAVENOUS
Status: DISCONTINUED | OUTPATIENT
Start: 2023-10-05 | End: 2023-10-05

## 2023-10-05 RX ORDER — MEPERIDINE HYDROCHLORIDE 25 MG/ML
12.5 INJECTION INTRAMUSCULAR; INTRAVENOUS; SUBCUTANEOUS ONCE
Status: DISCONTINUED | OUTPATIENT
Start: 2023-10-05 | End: 2023-10-05 | Stop reason: HOSPADM

## 2023-10-05 RX ORDER — EPINEPHRINE NASAL SOLUTION 1 MG/ML
SOLUTION NASAL
Status: DISCONTINUED
Start: 2023-10-05 | End: 2023-10-05 | Stop reason: HOSPADM

## 2023-10-05 RX ORDER — DEXAMETHASONE SODIUM PHOSPHATE 4 MG/ML
INJECTION, SOLUTION INTRA-ARTICULAR; INTRALESIONAL; INTRAMUSCULAR; INTRAVENOUS; SOFT TISSUE
Status: DISCONTINUED | OUTPATIENT
Start: 2023-10-05 | End: 2023-10-05

## 2023-10-05 RX ORDER — LIDOCAINE HYDROCHLORIDE 10 MG/ML
INJECTION, SOLUTION EPIDURAL; INFILTRATION; INTRACAUDAL; PERINEURAL
Status: DISCONTINUED | OUTPATIENT
Start: 2023-10-05 | End: 2023-10-05

## 2023-10-05 RX ORDER — MIDAZOLAM HYDROCHLORIDE 1 MG/ML
2 INJECTION INTRAMUSCULAR; INTRAVENOUS ONCE AS NEEDED
Status: COMPLETED | OUTPATIENT
Start: 2023-10-05 | End: 2023-10-05

## 2023-10-05 RX ORDER — HYDROMORPHONE HYDROCHLORIDE 2 MG/ML
INJECTION, SOLUTION INTRAMUSCULAR; INTRAVENOUS; SUBCUTANEOUS
Status: DISCONTINUED | OUTPATIENT
Start: 2023-10-05 | End: 2023-10-05

## 2023-10-05 RX ORDER — ROCURONIUM BROMIDE 10 MG/ML
INJECTION, SOLUTION INTRAVENOUS
Status: DISCONTINUED | OUTPATIENT
Start: 2023-10-05 | End: 2023-10-05

## 2023-10-05 RX ORDER — CEFAZOLIN SODIUM 1 G/3ML
2 INJECTION, POWDER, FOR SOLUTION INTRAMUSCULAR; INTRAVENOUS
Status: COMPLETED | OUTPATIENT
Start: 2023-10-05 | End: 2023-10-05

## 2023-10-05 RX ORDER — ONDANSETRON 2 MG/ML
4 INJECTION INTRAMUSCULAR; INTRAVENOUS ONCE
Status: DISCONTINUED | OUTPATIENT
Start: 2023-10-05 | End: 2023-10-05 | Stop reason: HOSPADM

## 2023-10-05 RX ORDER — ERYTHROMYCIN 5 MG/G
OINTMENT OPHTHALMIC
Status: DISCONTINUED | OUTPATIENT
Start: 2023-10-05 | End: 2023-10-05 | Stop reason: HOSPADM

## 2023-10-05 RX ORDER — SODIUM CHLORIDE, SODIUM LACTATE, POTASSIUM CHLORIDE, CALCIUM CHLORIDE 600; 310; 30; 20 MG/100ML; MG/100ML; MG/100ML; MG/100ML
INJECTION, SOLUTION INTRAVENOUS CONTINUOUS
Status: DISCONTINUED | OUTPATIENT
Start: 2023-10-05 | End: 2023-10-05 | Stop reason: HOSPADM

## 2023-10-05 RX ORDER — PROPOFOL 10 MG/ML
VIAL (ML) INTRAVENOUS
Status: DISCONTINUED | OUTPATIENT
Start: 2023-10-05 | End: 2023-10-05

## 2023-10-05 RX ORDER — SODIUM CHLORIDE 9 MG/ML
INJECTION, SOLUTION INTRAVENOUS CONTINUOUS
Status: DISCONTINUED | OUTPATIENT
Start: 2023-10-05 | End: 2023-10-05 | Stop reason: HOSPADM

## 2023-10-05 RX ADMIN — SODIUM CHLORIDE, POTASSIUM CHLORIDE, SODIUM LACTATE AND CALCIUM CHLORIDE: 600; 310; 30; 20 INJECTION, SOLUTION INTRAVENOUS at 12:10

## 2023-10-05 RX ADMIN — DEXAMETHASONE SODIUM PHOSPHATE 12 MG: 4 INJECTION, SOLUTION INTRA-ARTICULAR; INTRALESIONAL; INTRAMUSCULAR; INTRAVENOUS; SOFT TISSUE at 12:10

## 2023-10-05 RX ADMIN — ACETAMINOPHEN 1000 MG: 10 INJECTION, SOLUTION INTRAVENOUS at 01:10

## 2023-10-05 RX ADMIN — FENTANYL CITRATE 100 MCG: 50 INJECTION, SOLUTION INTRAMUSCULAR; INTRAVENOUS at 12:10

## 2023-10-05 RX ADMIN — ROCURONIUM BROMIDE 50 MG: 50 INJECTION INTRAVENOUS at 12:10

## 2023-10-05 RX ADMIN — Medication 150 MG: at 12:10

## 2023-10-05 RX ADMIN — SUGAMMADEX 200 MG: 100 INJECTION, SOLUTION INTRAVENOUS at 01:10

## 2023-10-05 RX ADMIN — HYDROMORPHONE HYDROCHLORIDE 0.4 MG: 2 INJECTION, SOLUTION INTRAMUSCULAR; INTRAVENOUS; SUBCUTANEOUS at 02:10

## 2023-10-05 RX ADMIN — LIDOCAINE HYDROCHLORIDE 20 MG: 10 INJECTION, SOLUTION EPIDURAL; INFILTRATION; INTRACAUDAL; PERINEURAL at 12:10

## 2023-10-05 RX ADMIN — Medication 50 MG: at 12:10

## 2023-10-05 RX ADMIN — PROMETHAZINE HYDROCHLORIDE 6.25 MG: 25 INJECTION INTRAMUSCULAR; INTRAVENOUS at 01:10

## 2023-10-05 RX ADMIN — HYDROMORPHONE HYDROCHLORIDE 0.2 MG: 2 INJECTION, SOLUTION INTRAMUSCULAR; INTRAVENOUS; SUBCUTANEOUS at 01:10

## 2023-10-05 RX ADMIN — MIDAZOLAM HYDROCHLORIDE 2 MG: 1 INJECTION, SOLUTION INTRAMUSCULAR; INTRAVENOUS at 11:10

## 2023-10-05 RX ADMIN — SODIUM CHLORIDE, POTASSIUM CHLORIDE, SODIUM LACTATE AND CALCIUM CHLORIDE: 600; 310; 30; 20 INJECTION, SOLUTION INTRAVENOUS at 10:10

## 2023-10-05 RX ADMIN — ONDANSETRON 4 MG: 2 INJECTION INTRAMUSCULAR; INTRAVENOUS at 01:10

## 2023-10-05 RX ADMIN — CEFAZOLIN 2 G: 330 INJECTION, POWDER, FOR SOLUTION INTRAMUSCULAR; INTRAVENOUS at 12:10

## 2023-10-05 RX ADMIN — HYDROMORPHONE HYDROCHLORIDE 0.4 MG: 2 INJECTION, SOLUTION INTRAMUSCULAR; INTRAVENOUS; SUBCUTANEOUS at 01:10

## 2023-10-05 NOTE — ANESTHESIA PROCEDURE NOTES
Intubation    Date/Time: 10/5/2023 12:37 PM    Performed by: Mara Bay CRNA  Authorized by: Primitivo Lucas MD    Intubation:     Induction:  Intravenous    Intubated:  Postinduction    Mask Ventilation:  Easy mask    Attempts:  1    Attempted By:  CRNA    Method of Intubation:  Direct    Blade:  Jimmie 3    Laryngeal View Grade: Grade I - full view of cords      Difficult Airway Encountered?: No      Complications:  None    Airway Device:  Oral endotracheal tube    Airway Device Size:  6.5    Style/Cuff Inflation:  Cuffed (inflated to minimal occlusive pressure)    Inflation Amount (mL):  6    Tube secured:  21    Secured at:  The lips    Placement Verified By:  Capnometry    Complicating Factors:  None    Findings Post-Intubation:  BS equal bilateral

## 2023-10-05 NOTE — TRANSFER OF CARE
Anesthesia Transfer of Care Note    Patient: Melinda Riggs    Procedure(s) Performed: Procedure(s) (LRB):  RHYTIDECTOMY, FOREHEAD endobrow with tines (N/A)  BLEPHAROPLASTY Bilateral Upper (Bilateral)    Patient location: PACU    Anesthesia Type: general    Transport from OR: Transported from OR on room air with adequate spontaneous ventilation    Post pain: adequate analgesia    Post assessment: no apparent anesthetic complications and tolerated procedure well    Post vital signs: stable    Level of consciousness: sedated    Nausea/Vomiting: no nausea/vomiting    Complications: none    Transfer of care protocol was followed

## 2023-10-05 NOTE — ANESTHESIA POSTPROCEDURE EVALUATION
Anesthesia Post Evaluation    Patient: Melinda Riggs    Procedure(s) Performed: Procedure(s) (LRB):  RHYTIDECTOMY, FOREHEAD endobrow with tines (N/A)  BLEPHAROPLASTY Bilateral Upper (Bilateral)    Final Anesthesia Type: general      Patient location during evaluation: PACU  Patient participation: Yes- Able to Participate  Level of consciousness: awake  Post-procedure vital signs: reviewed and stable  Pain management: adequate  Airway patency: patent    PONV status at discharge: vomiting (controlled) and nausea (controlled)  Anesthetic complications: no      Cardiovascular status: hemodynamically stable  Respiratory status: spontaneous ventilation and unassisted  Hydration status: euvolemic  Follow-up not needed.  Comments:              Vitals Value Taken Time   /63 10/05/23 1410   Temp 36.6 °C (97.9 °F) 10/05/23 1356   Pulse 56 10/05/23 1415   Resp 19 10/05/23 1415   SpO2 100 % 10/05/23 1415   Vitals shown include unvalidated device data.      No case tracking events are documented in the log.      Pain/Mendy Score: Pain Rating Prior to Med Admin: 6 (10/5/2023  2:00 PM)  Mendy Score: 7 (10/5/2023  1:56 PM)

## 2023-10-05 NOTE — PLAN OF CARE
Pt arouses easily-she is wery3-sua-stojubg score 9/10-she reports comfort-she meets criteria for phase2 care-to rm 3 via bed with paul

## 2023-10-05 NOTE — ANESTHESIA PREPROCEDURE EVALUATION
"                                                                                                             10/05/2023  Melinda Riggs is a 72 y.o., female   Pre-operative evaluation for Procedure(s) (LRB):  RHYTIDECTOMY, FOREHEAD endobrow with tines (N/A)  BLEPHAROPLASTY Bilateral Upper (Bilateral)    BP (!) 172/85   Pulse 64   Temp 36.7 °C (98.1 °F) (Oral)   Resp 18   Ht 5' 3" (1.6 m)   Wt 87.6 kg (193 lb 2 oz)   SpO2 (!) 93%   Breastfeeding No   BMI 34.21 kg/m²     Past Medical History:   Diagnosis Date    Arthritis     COPD (chronic obstructive pulmonary disease)     Hypercholesteremia     Hypertension     MEGHANN on CPAP        Patient Active Problem List   Diagnosis    Chronic obstructive pulmonary disease    Knee pain    Acute pain of both shoulders    Abnormal urine color    Arteriosclerosis of coronary artery    Arthritis    Back pain    Breast density    Cough    Epigastric pain    Gastroesophageal reflux disease without esophagitis    Herniation of intervertebral disc of cervical region    Hypertriglyceridemia    Essential hypertension    Mixed anxiety depressive disorder    Obesity    Shortness of breath    Sleep apnea    Insomnia    Knee osteonecrosis, right    Dysphagia    Menopause    Neuropathy    Stage 3b chronic kidney disease    Type 2 diabetes mellitus with hyperglycemia, without long-term current use of insulin    Aortic atherosclerosis       Review of patient's allergies indicates:  No Known Allergies    Current Outpatient Medications   Medication Instructions    albuterol (PROVENTIL) 2.5 mg, Nebulization, Every 6 hours PRN, Rescue    albuterol (PROVENTIL/VENTOLIN HFA) 90 mcg/actuation inhaler 2 puffs, Inhalation, Every 6 hours PRN, Rescue    aspirin (ECOTRIN) 81 mg, Oral, Daily    atorvastatin (LIPITOR) 40 mg, Oral, Nightly    carvediloL (COREG) 6.25 mg, Oral, 2 times daily    docosahexaenoic acid/epa (FISH OIL ORAL) 2 capsules, Oral, Daily    " "famotidine (PEPCID) 20 mg, Oral, 2 times daily    fluticasone propionate (FLONASE) 50 mcg/actuation nasal spray 1 spray, Each Nostril, Daily PRN    fluticasone-umeclidin-vilanter (TRELEGY ELLIPTA) 100-62.5-25 mcg DsDv 1 puff, Inhalation, Daily    furosemide (LASIX) 40 mg, Oral, Daily    isosorbide mononitrate (IMDUR) 30 mg, Oral, Daily    losartan (COZAAR) 100 mg, Oral, Daily    meloxicam (MOBIC) 15 mg, Oral, Daily    oxyCODONE-acetaminophen (PERCOCET)  mg per tablet 1 tablet, Oral, Every 24 hours as needed    oxyCODONE-acetaminophen (PERCOCET) 5-325 mg per tablet 1 tablet, Oral, Every 8 hours PRN       Past Surgical History:   Procedure Laterality Date    APPENDECTOMY      CARPAL TUNNEL RELEASE Bilateral     CHOLECYSTECTOMY      COLONOSCOPY  08/05/2022    Stefan Singh MD    EYE SURGERY      HYSTERECTOMY  1999    TONSILLECTOMY      TOTAL KNEE ARTHROPLASTY  10/26/2022         Lab Results   Component Value Date    WBC 12.68 (H) 09/13/2023    HGB 13.2 09/13/2023    HCT 41.3 09/13/2023    MCV 92.0 09/13/2023     09/13/2023          BMP  Lab Results   Component Value Date     09/13/2023    K 4.8 09/13/2023    CHLORIDE 102 09/13/2023    CO2 26 09/13/2023    GLUCOSE 89 09/13/2023    BUN 32.8 (H) 09/13/2023    CREATININE 1.73 (H) 09/13/2023    CALCIUM 9.8 09/13/2023    EGFRNONAA 36 06/27/2022        INR  No results for input(s): "PT", "INR", "PROTIME", "APTT" in the last 72 hours.        Diagnostic Studies:      EKG:  Results for orders placed or performed in visit on 09/13/23   EKG 12-lead    Collection Time: 09/13/23 11:05 AM    Narrative    Test Reason : Z01.818,Z79.01,    Vent. Rate : 057 BPM     Atrial Rate : 057 BPM     P-R Int : 126 ms          QRS Dur : 094 ms      QT Int : 438 ms       P-R-T Axes : 043 040 032 degrees     QTc Int : 426 ms    Sinus bradycardia  Low voltage QRS  Borderline Abnormal ECG  Confirmed by Stanton HOPE, Tom ([id number]) on 9/15/2023 1:39:31 AM    Referred " By: LEO COSTELLO           Confirmed By:Tom Eid MD       No results found for this or any previous visit.        .      Pre-op Assessment    I have reviewed the Patient Summary Reports.    I have reviewed the NPO Status.   I have reviewed the Medications.     Review of Systems  Anesthesia Hx:  No problems with previous Anesthesia  Denies Family Hx of Anesthesia complications.   Denies Personal Hx of Anesthesia complications.   Cardiovascular:  Cardiovascular Normal  No Cardiac Complaints   Pulmonary:   COPD No Pulmonary Complaints   Renal/:   Chronic Renal Disease, CKD    Hepatic/GI:   No Current GERD Sx       Physical Exam  General: Alert and Oriented    Airway:  Mallampati: II   Mouth Opening: Normal  TM Distance: Normal  Tongue: Normal  Neck ROM: Normal ROM    Dental:  Edentulous, Dentures    Chest/Lungs:  Clear to auscultation, Normal Respiratory Rate    Heart:  Rate: Normal  Rhythm: Regular Rhythm        Anesthesia Plan  Type of Anesthesia, risks & benefits discussed:    Anesthesia Type: Gen ETT  Intra-op Monitoring Plan: Standard ASA Monitors  Post Op Pain Control Plan: multimodal analgesia  Induction:  IV and Inhalation  Airway Plan: Direct, Post-Induction  Informed Consent: Informed consent signed with the Patient and all parties understand the risks and agree with anesthesia plan.  All questions answered.   ASA Score: 3  Day of Surgery Review of History & Physical: H&P Update referred to the surgeon/provider.  Anesthesia Plan Notes: Discussed Anesthetic Plan w/ Pt/Family. Questions Entertained. Accepted.    Ready For Surgery From Anesthesia Perspective.     .

## 2023-10-06 VITALS
SYSTOLIC BLOOD PRESSURE: 130 MMHG | HEART RATE: 57 BPM | BODY MASS INDEX: 34.22 KG/M2 | DIASTOLIC BLOOD PRESSURE: 78 MMHG | RESPIRATION RATE: 16 BRPM | TEMPERATURE: 98 F | WEIGHT: 193.13 LBS | OXYGEN SATURATION: 97 % | HEIGHT: 63 IN

## 2023-10-30 NOTE — OP NOTE
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    PROCEDURE IN DETAIL:  With proper consent information, the patient was brought   to the operating room, placed on operative table in supine position.  With   satisfactory endotracheal intubation and general anesthesia, the patient was   placed asleep.  The forehead was injected with approximately 4 mL of 2%   xylocaine with epinephrine.  The upper lip was injected with a 0.5 mL of 2%   xylocaine with epinephrine.  The brow lift was done 1st, two 1 cm vertical   incision was made in the interpupillary line 1 fingerbreadth behind the   hairline.  This was carried down and a subperiosteal dissection all the way back   to the occipital area.  The anterior dissection exposed the glabella and the   rims of the orbital rim, which was elevated off the frontal area and at that   point, the periosteum was elevated.  The supratrochlear nerves were identified.    The periosteum was opened and the procerus and  muscle were ligated   sharply.  At that point, the scalp was rotated posteriorly and secured in place   with two 3.5 ligature in the times in a normal anatomical position.  There was   no bleeding.  The incisions were closed with a staple ligature.  At that point,   the blepharoplasty was done.  The curvilinear incision was made over the upper   eyelid about 15 mm above the ciliary line in a curvilinear type fashion.    Approximately  0.6 mm of upper eyelid skin was removed.  A small strip of   orbicularis oculi muscle was removed.  Then the central medial orbital fat pad   was opened and the orbital fat pad that herniated out this area was removed   sharply.  She tolerated the procedure very well.  At the end of the procedure,   it was carried out in symmetry and the meticulous hemostasis was done.  It was   closed with a 5-0 fast-absorbing suture and light pressure dressing was placed.    The patient was transferred back to recovery room awake, alert, and responsive.      This patient is status post ptosis repair. The patient tolerated the procedure well and is discharged home with follow up instructions.    ______________________________  Nate NG/MICHELLES  DD:  10/30/2023  Time:  12:26PM  DT:  10/30/2023  Time:  05:16PM  Job #:  [id number]      OPERATIVE REPORT

## 2024-01-04 ENCOUNTER — LAB VISIT (OUTPATIENT)
Dept: LAB | Facility: HOSPITAL | Age: 74
End: 2024-01-04
Attending: FAMILY MEDICINE
Payer: MEDICARE

## 2024-01-04 DIAGNOSIS — E55.9 VITAMIN D DEFICIENCY: ICD-10-CM

## 2024-01-04 DIAGNOSIS — I10 ESSENTIAL HYPERTENSION: ICD-10-CM

## 2024-01-04 DIAGNOSIS — N32.81 OAB (OVERACTIVE BLADDER): ICD-10-CM

## 2024-01-04 DIAGNOSIS — E11.65 TYPE 2 DIABETES MELLITUS WITH HYPERGLYCEMIA, WITHOUT LONG-TERM CURRENT USE OF INSULIN: ICD-10-CM

## 2024-01-04 DIAGNOSIS — E78.49 OTHER HYPERLIPIDEMIA: ICD-10-CM

## 2024-01-04 DIAGNOSIS — E03.8 OTHER SPECIFIED HYPOTHYROIDISM: ICD-10-CM

## 2024-01-04 DIAGNOSIS — E53.8 VITAMIN B12 DEFICIENCY: ICD-10-CM

## 2024-01-04 PROBLEM — E66.01 SEVERE OBESITY (BMI 35.0-39.9) WITH COMORBIDITY: Status: ACTIVE | Noted: 2022-06-25

## 2024-01-04 LAB
ALBUMIN SERPL-MCNC: 3.5 G/DL (ref 3.4–4.8)
ALBUMIN/GLOB SERPL: 1 RATIO (ref 1.1–2)
ALP SERPL-CCNC: 81 UNIT/L (ref 40–150)
ALT SERPL-CCNC: 18 UNIT/L (ref 0–55)
APPEARANCE UR: CLEAR
AST SERPL-CCNC: 16 UNIT/L (ref 5–34)
BACTERIA #/AREA URNS AUTO: NORMAL /HPF
BASOPHILS # BLD AUTO: 0.02 X10(3)/MCL
BASOPHILS NFR BLD AUTO: 0.2 %
BILIRUB SERPL-MCNC: 0.7 MG/DL
BILIRUB UR QL STRIP.AUTO: NEGATIVE
BUN SERPL-MCNC: 16.1 MG/DL (ref 9.8–20.1)
CALCIUM SERPL-MCNC: 10.3 MG/DL (ref 8.4–10.2)
CHLORIDE SERPL-SCNC: 104 MMOL/L (ref 98–107)
CHOLEST SERPL-MCNC: 197 MG/DL
CHOLEST/HDLC SERPL: 3 {RATIO} (ref 0–5)
CO2 SERPL-SCNC: 30 MMOL/L (ref 23–31)
COLOR UR AUTO: YELLOW
CREAT SERPL-MCNC: 1.25 MG/DL (ref 0.55–1.02)
DEPRECATED CALCIDIOL+CALCIFEROL SERPL-MC: 30 NG/ML (ref 30–80)
EOSINOPHIL # BLD AUTO: 0.17 X10(3)/MCL (ref 0–0.9)
EOSINOPHIL NFR BLD AUTO: 1.9 %
ERYTHROCYTE [DISTWIDTH] IN BLOOD BY AUTOMATED COUNT: 13.6 % (ref 11.5–17)
EST. AVERAGE GLUCOSE BLD GHB EST-MCNC: 125.5 MG/DL
GFR SERPLBLD CREATININE-BSD FMLA CKD-EPI: 46 MLS/MIN/1.73/M2
GLOBULIN SER-MCNC: 3.5 GM/DL (ref 2.4–3.5)
GLUCOSE SERPL-MCNC: 93 MG/DL (ref 82–115)
GLUCOSE UR QL STRIP.AUTO: NEGATIVE
HBA1C MFR BLD: 6 %
HCT VFR BLD AUTO: 40 % (ref 37–47)
HDLC SERPL-MCNC: 58 MG/DL (ref 35–60)
HGB BLD-MCNC: 12.1 G/DL (ref 12–16)
IMM GRANULOCYTES # BLD AUTO: 0.02 X10(3)/MCL (ref 0–0.04)
IMM GRANULOCYTES NFR BLD AUTO: 0.2 %
KETONES UR QL STRIP.AUTO: NEGATIVE
LDLC SERPL CALC-MCNC: 116 MG/DL (ref 50–140)
LEUKOCYTE ESTERASE UR QL STRIP.AUTO: NEGATIVE
LYMPHOCYTES # BLD AUTO: 1.82 X10(3)/MCL (ref 0.6–4.6)
LYMPHOCYTES NFR BLD AUTO: 20.5 %
MCH RBC QN AUTO: 28.3 PG (ref 27–31)
MCHC RBC AUTO-ENTMCNC: 30.3 G/DL (ref 33–36)
MCV RBC AUTO: 93.5 FL (ref 80–94)
MONOCYTES # BLD AUTO: 0.99 X10(3)/MCL (ref 0.1–1.3)
MONOCYTES NFR BLD AUTO: 11.1 %
NEUTROPHILS # BLD AUTO: 5.86 X10(3)/MCL (ref 2.1–9.2)
NEUTROPHILS NFR BLD AUTO: 66.1 %
NITRITE UR QL STRIP.AUTO: NEGATIVE
PH UR STRIP.AUTO: 5.5 [PH]
PLATELET # BLD AUTO: 302 X10(3)/MCL (ref 130–400)
PMV BLD AUTO: 11.5 FL (ref 7.4–10.4)
POTASSIUM SERPL-SCNC: 4.7 MMOL/L (ref 3.5–5.1)
PROT SERPL-MCNC: 7 GM/DL (ref 5.8–7.6)
PROT UR QL STRIP.AUTO: NEGATIVE
RBC # BLD AUTO: 4.28 X10(6)/MCL (ref 4.2–5.4)
RBC #/AREA URNS AUTO: NORMAL /HPF
RBC UR QL AUTO: ABNORMAL
SODIUM SERPL-SCNC: 141 MMOL/L (ref 136–145)
SP GR UR STRIP.AUTO: >=1.03 (ref 1–1.03)
SQUAMOUS #/AREA URNS AUTO: NORMAL /HPF
T3FREE SERPL-MCNC: 1.95 PG/ML (ref 1.58–3.91)
T4 FREE SERPL-MCNC: 1.08 NG/DL (ref 0.7–1.48)
TRIGL SERPL-MCNC: 114 MG/DL (ref 37–140)
TSH SERPL-ACNC: 0.72 UIU/ML (ref 0.35–4.94)
UROBILINOGEN UR STRIP-ACNC: 0.2
VIT B12 SERPL-MCNC: 395 PG/ML (ref 213–816)
VLDLC SERPL CALC-MCNC: 23 MG/DL
WBC # SPEC AUTO: 8.88 X10(3)/MCL (ref 4.5–11.5)
WBC #/AREA URNS AUTO: NORMAL /HPF

## 2024-01-04 PROCEDURE — 80061 LIPID PANEL: CPT

## 2024-01-04 PROCEDURE — 81003 URINALYSIS AUTO W/O SCOPE: CPT

## 2024-01-04 PROCEDURE — 36415 COLL VENOUS BLD VENIPUNCTURE: CPT

## 2024-01-04 PROCEDURE — 85025 COMPLETE CBC W/AUTO DIFF WBC: CPT

## 2024-01-04 PROCEDURE — 80053 COMPREHEN METABOLIC PANEL: CPT

## 2024-01-04 PROCEDURE — 84443 ASSAY THYROID STIM HORMONE: CPT

## 2024-01-04 PROCEDURE — 84439 ASSAY OF FREE THYROXINE: CPT

## 2024-01-04 PROCEDURE — 84481 FREE ASSAY (FT-3): CPT

## 2024-01-04 PROCEDURE — 82607 VITAMIN B-12: CPT

## 2024-01-04 PROCEDURE — 83036 HEMOGLOBIN GLYCOSYLATED A1C: CPT

## 2024-01-04 PROCEDURE — 82306 VITAMIN D 25 HYDROXY: CPT

## 2024-03-03 ENCOUNTER — HOSPITAL ENCOUNTER (EMERGENCY)
Facility: HOSPITAL | Age: 74
Discharge: HOME | End: 2024-03-03
Attending: FAMILY MEDICINE
Payer: MEDICARE

## 2024-03-03 VITALS
HEART RATE: 73 BPM | DIASTOLIC BLOOD PRESSURE: 84 MMHG | TEMPERATURE: 98 F | OXYGEN SATURATION: 95 % | SYSTOLIC BLOOD PRESSURE: 134 MMHG | BODY MASS INDEX: 37.03 KG/M2 | HEIGHT: 63 IN | RESPIRATION RATE: 18 BRPM | WEIGHT: 209 LBS

## 2024-03-03 DIAGNOSIS — R25.1 TREMOR: Primary | ICD-10-CM

## 2024-03-03 PROCEDURE — 99281 EMR DPT VST MAYX REQ PHY/QHP: CPT

## 2024-03-04 ENCOUNTER — PATIENT OUTREACH (OUTPATIENT)
Dept: EMERGENCY MEDICINE | Facility: HOSPITAL | Age: 74
End: 2024-03-04
Payer: MEDICARE

## 2024-03-04 NOTE — PROGRESS NOTES
Patient discharged from the ED on 3/3/24 for tremors. Patient was notified that she is scheduled for her post ED 7-day follow up with Dr Pedroza on 3/6/24 at 9:45 am.

## 2024-03-04 NOTE — PROGRESS NOTES
Patient returned call. ED Navigator reminded the patient of scheduled appointment with Dr Pedroza on 3/6/24 at 9:45 am. Patient agreed to appointment date and time. Patient declined additional services. Follow up encounter closed.

## 2024-03-04 NOTE — ED PROVIDER NOTES
Encounter Date: 3/3/2024       History     Chief Complaint   Patient presents with    Tremors     New onset tremor of hand. States she was trying to hold her phone and her hand began to involuntarily shaking. Recently diagnosed with shingles.      Tremor after taking medicine   73-year-old female patient who took pregabalin for shingles developed a tremor which unfortunately part of the medication adverse effects coming listed is tremor patient otherwise is on antiviral medicine patient states that she stopped taking the pregabalin yesterday because she noted that it was causing the tremors worsened when she took the medicine patient has no chronic history contributing to today's episode patient is history source        Review of patient's allergies indicates:  No Known Allergies  Past Medical History:   Diagnosis Date    Arthritis     COPD (chronic obstructive pulmonary disease)     Hypercholesteremia     Hypertension     MEGHANN on CPAP      Past Surgical History:   Procedure Laterality Date    APPENDECTOMY      BLEPHAROPLASTY Bilateral 10/5/2023    Procedure: BLEPHAROPLASTY Bilateral Upper;  Surgeon: Nate Banuelos MD;  Location: [facility] OR;  Service: ENT;  Laterality: Bilateral;    BROW LIFT N/A 10/5/2023    Procedure: RHYTIDECTOMY, FOREHEAD endobrow with tines;  Surgeon: Nate Banuelos MD;  Location: [facility] OR;  Service: ENT;  Laterality: N/A;    CARPAL TUNNEL RELEASE Bilateral     CHOLECYSTECTOMY      COLONOSCOPY  08/05/2022    Stefan Singh MD    EYE SURGERY      HYSTERECTOMY  1999    TONSILLECTOMY      TOTAL KNEE ARTHROPLASTY  10/26/2022     Family History   Problem Relation Age of Onset    Lung cancer Mother     Heart attack Father      Social History     Tobacco Use    Smoking status: Former     Types: Cigarettes    Smokeless tobacco: Never    Tobacco comments:     Pt quit smoking about 35-40 years ago. Pt smoked for a total of 22-25 years. Pt used to smoke about 1.5PPD   Substance Use Topics     Alcohol use: Yes     Alcohol/week: 1.0 standard drink of alcohol     Types: 1 Shots of liquor per week     Comment: once a month    Drug use: Never     Review of Systems   Constitutional:  Negative for fever.   HENT:  Negative for sore throat.    Respiratory:  Negative for shortness of breath.    Cardiovascular:  Negative for chest pain.   Gastrointestinal:  Negative for nausea.   Genitourinary:  Negative for dysuria.   Musculoskeletal:  Negative for back pain.   Skin:  Negative for rash.   Neurological:  Positive for tremors. Negative for weakness.   Hematological:  Does not bruise/bleed easily.       Physical Exam     Initial Vitals [03/03/24 2223]   BP Pulse Resp Temp SpO2   134/84 73 18 98.1 °F (36.7 °C) 95 %      MAP       --         Physical Exam    Nursing note and vitals reviewed.  Constitutional: She appears well-developed.   HENT:   Head: Normocephalic and atraumatic.   Right Ear: External ear normal.   Left Ear: External ear normal.   Nose: Nose normal.   Mouth/Throat: Oropharynx is clear and moist. No oropharyngeal exudate.   Eyes: Conjunctivae and EOM are normal. Pupils are equal, round, and reactive to light. Right eye exhibits no discharge. Left eye exhibits no discharge.   Neck: Neck supple. No tracheal deviation present. No JVD present.   Normal range of motion.  Cardiovascular:  Normal rate, regular rhythm, normal heart sounds and intact distal pulses.     Exam reveals no gallop and no friction rub.       No murmur heard.  Pulmonary/Chest: Breath sounds normal. No stridor. No respiratory distress. She has no wheezes. She has no rhonchi. She has no rales.   Abdominal: Abdomen is soft. Bowel sounds are normal. She exhibits no distension and no mass. There is no abdominal tenderness. There is no rebound and no guarding.   Musculoskeletal:         General: Normal range of motion.      Cervical back: Normal range of motion and neck supple.     Neurological: She is alert and oriented to person, place,  and time. She has normal strength. No cranial nerve deficit. GCS score is 15. GCS eye subscore is 4. GCS verbal subscore is 5. GCS motor subscore is 6.   Skin: Skin is warm and dry. No rash and no abscess noted. No erythema.   Psychiatric: She has a normal mood and affect. Her behavior is normal. Judgment and thought content normal.         ED Course   Procedures  Labs Reviewed - No data to display       Imaging Results    None          Medications - No data to display  Medical Decision Making  Medication reaction tremors Parkinson's disease low blood sugar                                      Clinical Impression:  Final diagnoses:  [R25.1] Tremor (Primary)          ED Disposition Condition    Discharge Stable          ED Prescriptions    None       Follow-up Information       Follow up With Specialties Details Why Contact Info    Dodie Pedroza MD [facility]   [address] [place] [id number]  [phone]               Zenon Mosqueda MD  03/03/24 6001

## 2024-03-04 NOTE — PROGRESS NOTES
ED navigator reminded patient about appointment for Wednesday (3/6/24) at 9:45 am with Dr Pedroza through voicemail, as they did not answer the call. ED navigator to close encounter at this time.

## 2024-05-01 ENCOUNTER — HOSPITAL ENCOUNTER (EMERGENCY)
Facility: HOSPITAL | Age: 74
Discharge: HOME | End: 2024-05-01
Attending: EMERGENCY MEDICINE
Payer: MEDICARE

## 2024-05-01 VITALS
HEART RATE: 70 BPM | BODY MASS INDEX: 36.32 KG/M2 | OXYGEN SATURATION: 97 % | SYSTOLIC BLOOD PRESSURE: 110 MMHG | HEIGHT: 63 IN | DIASTOLIC BLOOD PRESSURE: 56 MMHG | TEMPERATURE: 98 F | RESPIRATION RATE: 18 BRPM | WEIGHT: 205 LBS

## 2024-05-01 DIAGNOSIS — I95.9 HYPOTENSION: ICD-10-CM

## 2024-05-01 DIAGNOSIS — E86.0 DEHYDRATION: Primary | ICD-10-CM

## 2024-05-01 LAB
ALBUMIN SERPL-MCNC: 3.5 G/DL (ref 3.4–4.8)
ALBUMIN/GLOB SERPL: 1.1 RATIO (ref 1.1–2)
ALP SERPL-CCNC: 71 UNIT/L (ref 40–150)
ALT SERPL-CCNC: 22 UNIT/L (ref 0–55)
AST SERPL-CCNC: 19 UNIT/L (ref 5–34)
BASOPHILS # BLD AUTO: 0.02 X10(3)/MCL
BASOPHILS NFR BLD AUTO: 0.2 %
BILIRUB SERPL-MCNC: 0.7 MG/DL
BUN SERPL-MCNC: 18.1 MG/DL (ref 9.8–20.1)
CALCIUM SERPL-MCNC: 9.5 MG/DL (ref 8.4–10.2)
CHLORIDE SERPL-SCNC: 107 MMOL/L (ref 98–107)
CO2 SERPL-SCNC: 23 MMOL/L (ref 23–31)
CREAT SERPL-MCNC: 1.73 MG/DL (ref 0.55–1.02)
EOSINOPHIL # BLD AUTO: 0.15 X10(3)/MCL (ref 0–0.9)
EOSINOPHIL NFR BLD AUTO: 1.5 %
ERYTHROCYTE [DISTWIDTH] IN BLOOD BY AUTOMATED COUNT: 13.4 % (ref 11.5–17)
GFR SERPLBLD CREATININE-BSD FMLA CKD-EPI: 31 MLS/MIN/1.73/M2
GLOBULIN SER-MCNC: 3.1 GM/DL (ref 2.4–3.5)
GLUCOSE SERPL-MCNC: 112 MG/DL (ref 82–115)
HCT VFR BLD AUTO: 37.3 % (ref 37–47)
HGB BLD-MCNC: 11.9 G/DL (ref 12–16)
IMM GRANULOCYTES # BLD AUTO: 0.01 X10(3)/MCL (ref 0–0.04)
IMM GRANULOCYTES NFR BLD AUTO: 0.1 %
LACTATE SERPL-SCNC: 1.5 MMOL/L (ref 0.5–2.2)
LYMPHOCYTES # BLD AUTO: 1.53 X10(3)/MCL (ref 0.6–4.6)
LYMPHOCYTES NFR BLD AUTO: 15.6 %
MCH RBC QN AUTO: 29.7 PG (ref 27–31)
MCHC RBC AUTO-ENTMCNC: 31.9 G/DL (ref 33–36)
MCV RBC AUTO: 93 FL (ref 80–94)
MONOCYTES # BLD AUTO: 0.85 X10(3)/MCL (ref 0.1–1.3)
MONOCYTES NFR BLD AUTO: 8.7 %
NEUTROPHILS # BLD AUTO: 7.22 X10(3)/MCL (ref 2.1–9.2)
NEUTROPHILS NFR BLD AUTO: 73.9 %
PLATELET # BLD AUTO: 272 X10(3)/MCL (ref 130–400)
PMV BLD AUTO: 10.9 FL (ref 7.4–10.4)
POTASSIUM SERPL-SCNC: 4.4 MMOL/L (ref 3.5–5.1)
PROT SERPL-MCNC: 6.6 GM/DL (ref 5.8–7.6)
RBC # BLD AUTO: 4.01 X10(6)/MCL (ref 4.2–5.4)
SODIUM SERPL-SCNC: 139 MMOL/L (ref 136–145)
WBC # SPEC AUTO: 9.78 X10(3)/MCL (ref 4.5–11.5)

## 2024-05-01 PROCEDURE — 25000003 PHARM REV CODE 250: Performed by: EMERGENCY MEDICINE

## 2024-05-01 PROCEDURE — 80053 COMPREHEN METABOLIC PANEL: CPT | Performed by: EMERGENCY MEDICINE

## 2024-05-01 PROCEDURE — 96360 HYDRATION IV INFUSION INIT: CPT

## 2024-05-01 PROCEDURE — 93010 ELECTROCARDIOGRAM REPORT: CPT | Mod: ,,, | Performed by: INTERNAL MEDICINE

## 2024-05-01 PROCEDURE — 99284 EMERGENCY DEPT VISIT MOD MDM: CPT | Mod: 25

## 2024-05-01 PROCEDURE — 83605 ASSAY OF LACTIC ACID: CPT | Performed by: EMERGENCY MEDICINE

## 2024-05-01 PROCEDURE — 93005 ELECTROCARDIOGRAM TRACING: CPT

## 2024-05-01 PROCEDURE — 85025 COMPLETE CBC W/AUTO DIFF WBC: CPT | Performed by: EMERGENCY MEDICINE

## 2024-05-01 RX ADMIN — SODIUM CHLORIDE 1000 ML: 9 INJECTION, SOLUTION INTRAVENOUS at 04:05

## 2024-05-01 NOTE — ED PROVIDER NOTES
Encounter Date: 5/1/2024       History     Chief Complaint   Patient presents with    Hypotension     Patient reports been feeling tired all week worst today checked blood pressure at home low blood pressure left Dr. Pedroza's office was told to come to er . Pt denies pain     This 73-year-old female presents with complaints of feeling tired for a week.  She states today she was at [employer] and she felt woozy.  At home she checked her blood pressure and it was low so she spoke to Dr. Carson office and they recommended she come here.  She denies nausea vomiting or diarrhea, she has had no fever or chills or sweats.  She denies blood in her stool or black bowel movements.  She had no chest pain or shortness of breath.  She does take Lasix 40 mg daily.       Review of patient's allergies indicates:   Allergen Reactions    Lyrica [pregabalin] Other (See Comments)     Adverse reaction causing tremors    Gabapentin Nausea Only and Other (See Comments)     Vertigo/Dizziness     Past Medical History:   Diagnosis Date    Arthritis     COPD (chronic obstructive pulmonary disease)     Hypercholesteremia     Hypertension     MEGHANN on CPAP      Past Surgical History:   Procedure Laterality Date    APPENDECTOMY      BLEPHAROPLASTY Bilateral 10/5/2023    Procedure: BLEPHAROPLASTY Bilateral Upper;  Surgeon: Nate Banuelos MD;  Location: [facility] OR;  Service: ENT;  Laterality: Bilateral;    BROW LIFT N/A 10/5/2023    Procedure: RHYTIDECTOMY, FOREHEAD endobrow with tines;  Surgeon: Nate Banuelos MD;  Location: [facility] OR;  Service: ENT;  Laterality: N/A;    CARPAL TUNNEL RELEASE Bilateral     CHOLECYSTECTOMY      COLONOSCOPY  08/05/2022    Stefan Singh MD    EYE SURGERY      HYSTERECTOMY  1999    TONSILLECTOMY      TOTAL KNEE ARTHROPLASTY  10/26/2022     Family History   Problem Relation Name Age of Onset    Lung cancer Mother      Heart attack Father       Social History     Tobacco Use    Smoking status: Former      Types: Cigarettes    Smokeless tobacco: Never    Tobacco comments:     Pt quit smoking about 35-40 years ago. Pt smoked for a total of 22-25 years. Pt used to smoke about 1.5PPD   Substance Use Topics    Alcohol use: Yes     Alcohol/week: 1.0 standard drink of alcohol     Types: 1 Shots of liquor per week     Comment: once a month    Drug use: Never     Review of Systems   Constitutional:  Positive for fatigue. Negative for fever.   HENT:  Negative for sore throat.    Respiratory:  Negative for shortness of breath.    Cardiovascular:  Negative for chest pain.   Gastrointestinal:  Negative for nausea.   Genitourinary:  Negative for dysuria.   Musculoskeletal:  Negative for back pain.   Skin:  Negative for rash.   Neurological:  Positive for weakness and light-headedness.   Hematological:  Does not bruise/bleed easily.       Physical Exam     Initial Vitals [05/01/24 1617]   BP Pulse Resp Temp SpO2   (!) 87/55 72 18 97.7 °F (36.5 °C) 95 %      MAP       --         Physical Exam    Nursing note and vitals reviewed.  Constitutional: She appears well-developed and well-nourished.   HENT:   Head: Normocephalic and atraumatic.   Eyes: Conjunctivae and EOM are normal. Pupils are equal, round, and reactive to light.   Neck: Neck supple.   Normal range of motion.  Cardiovascular:  Normal rate, regular rhythm, normal heart sounds and intact distal pulses.           Pulmonary/Chest: Breath sounds normal.   Abdominal: Abdomen is soft. Bowel sounds are normal.   Musculoskeletal:         General: Normal range of motion.      Cervical back: Normal range of motion and neck supple.     Neurological: She is alert and oriented to person, place, and time. She has normal strength.   Skin: Skin is warm and dry. Capillary refill takes less than 2 seconds.   Psychiatric: She has a normal mood and affect. Her behavior is normal. Judgment and thought content normal.         ED Course   Procedures  Labs Reviewed   COMPREHENSIVE METABOLIC PANEL  - Abnormal; Notable for the following components:       Result Value    Creatinine 1.73 (*)     All other components within normal limits   CBC WITH DIFFERENTIAL - Abnormal; Notable for the following components:    RBC 4.01 (*)     Hgb 11.9 (*)     MCHC 31.9 (*)     MPV 10.9 (*)     All other components within normal limits   LACTIC ACID, PLASMA - Normal   CBC W/ AUTO DIFFERENTIAL    Narrative:     The following orders were created for panel order CBC auto differential.  Procedure                               Abnormality         Status                     ---------                               -----------         ------                     CBC with Differential[[id number]]       Abnormal            Final result                 Please view results for these tests on the individual orders.     EKG Readings: (Independently Interpreted)   Rhythm: Normal Sinus Rhythm. Heart Rate: 68. Ectopy: No Ectopy. Conduction: Normal. ST Segments: Normal ST Segments. T Waves: Normal. Axis: Normal.   5/1/24 1638       Imaging Results    None          Medications   sodium chloride 0.9% bolus 1,000 mL 1,000 mL (0 mLs Intravenous Stopped 5/1/24 3055)     Medical Decision Making  The patient feels better after IV fluids and her orthostatics are normal now.  There is no other indication of blood loss or infection.  I think having been outside for the past several days at [place] [address] [place] and being on Lasix 40 a day and not drinking much caught up with her.     Amount and/or Complexity of Data Reviewed  Labs: ordered. Decision-making details documented in ED Course.                                      Clinical Impression:  Final diagnoses:  [I95.9] Hypotension  [E86.0] Dehydration (Primary)          ED Disposition Condition    Discharge Stable          ED Prescriptions    None       Follow-up Information       Follow up With Specialties Details Why Contact Dodie Vidal MD Family Medicine Schedule an appointment as soon as possible  for a visit  As needed, If symptoms worsen [address] [place] [address]  [place] [address]  [phone]               Maxi Nava MD  05/01/24 0184

## 2024-05-02 ENCOUNTER — PATIENT OUTREACH (OUTPATIENT)
Dept: EMERGENCY MEDICINE | Facility: HOSPITAL | Age: 74
End: 2024-05-02
Payer: MEDICARE

## 2024-05-02 LAB
OHS QRS DURATION: 78 MS
OHS QTC CALCULATION: 478 MS

## 2024-07-22 ENCOUNTER — HOSPITAL ENCOUNTER (OUTPATIENT)
Dept: RADIOLOGY | Facility: HOSPITAL | Age: 74
Discharge: HOME | End: 2024-07-22
Attending: FAMILY MEDICINE
Payer: MEDICARE

## 2024-07-22 DIAGNOSIS — M79.671 RIGHT FOOT PAIN: ICD-10-CM

## 2024-07-22 PROCEDURE — 73630 X-RAY EXAM OF FOOT: CPT | Mod: TC,RT

## 2024-07-26 ENCOUNTER — HOSPITAL ENCOUNTER (OUTPATIENT)
Dept: RADIOLOGY | Facility: HOSPITAL | Age: 74
Discharge: HOME | End: 2024-07-26
Attending: FAMILY MEDICINE
Payer: MEDICARE

## 2024-07-26 DIAGNOSIS — R10.9 RIGHT FLANK PAIN: ICD-10-CM

## 2024-07-26 DIAGNOSIS — R10.11 RIGHT UPPER QUADRANT ABDOMINAL PAIN: ICD-10-CM

## 2024-07-26 LAB
CREAT SERPL-MCNC: 2.8 MG/DL (ref 0.5–1.4)
SAMPLE: ABNORMAL

## 2024-07-26 PROCEDURE — 25500020 PHARM REV CODE 255: Performed by: FAMILY MEDICINE

## 2024-07-26 PROCEDURE — 74176 CT ABD & PELVIS W/O CONTRAST: CPT | Mod: TC

## 2024-07-26 RX ADMIN — DIATRIZOATE MEGLUMINE AND DIATRIZOATE SODIUM 30 ML: 600; 100 SOLUTION ORAL; RECTAL at 09:07

## 2024-10-12 ENCOUNTER — HOSPITAL ENCOUNTER (EMERGENCY)
Facility: HOSPITAL | Age: 74
Discharge: HOME | End: 2024-10-12
Attending: STUDENT IN AN ORGANIZED HEALTH CARE EDUCATION/TRAINING PROGRAM
Payer: MEDICARE

## 2024-10-12 VITALS
OXYGEN SATURATION: 97 % | WEIGHT: 207 LBS | TEMPERATURE: 98 F | DIASTOLIC BLOOD PRESSURE: 66 MMHG | HEIGHT: 63 IN | BODY MASS INDEX: 36.68 KG/M2 | RESPIRATION RATE: 15 BRPM | SYSTOLIC BLOOD PRESSURE: 120 MMHG | HEART RATE: 72 BPM

## 2024-10-12 DIAGNOSIS — E86.0 DEHYDRATION: ICD-10-CM

## 2024-10-12 DIAGNOSIS — N39.0 URINARY TRACT INFECTION WITHOUT HEMATURIA, SITE UNSPECIFIED: ICD-10-CM

## 2024-10-12 DIAGNOSIS — N17.9 AKI (ACUTE KIDNEY INJURY): ICD-10-CM

## 2024-10-12 DIAGNOSIS — R53.83 FATIGUE: Primary | ICD-10-CM

## 2024-10-12 LAB
ALBUMIN SERPL-MCNC: 3.3 G/DL (ref 3.4–4.8)
ALBUMIN/GLOB SERPL: 1.4 RATIO (ref 1.1–2)
ALP SERPL-CCNC: 68 UNIT/L (ref 40–150)
ALT SERPL-CCNC: 17 UNIT/L (ref 0–55)
ANION GAP SERPL CALC-SCNC: 10 MEQ/L
APTT PPP: 24.3 SECONDS (ref 23.2–33.7)
AST SERPL-CCNC: 18 UNIT/L (ref 5–34)
BACTERIA #/AREA URNS AUTO: ABNORMAL /HPF
BASOPHILS # BLD AUTO: 0.06 X10(3)/MCL
BASOPHILS NFR BLD AUTO: 0.5 %
BILIRUB SERPL-MCNC: 0.7 MG/DL
BILIRUB UR QL STRIP.AUTO: NEGATIVE
BNP BLD-MCNC: 12.6 PG/ML
BUN SERPL-MCNC: 25.6 MG/DL (ref 9.8–20.1)
CALCIUM SERPL-MCNC: 8.5 MG/DL (ref 8.4–10.2)
CHLORIDE SERPL-SCNC: 101 MMOL/L (ref 98–107)
CLARITY UR: CLEAR
CO2 SERPL-SCNC: 23 MMOL/L (ref 23–31)
COLOR UR AUTO: COLORLESS
CREAT SERPL-MCNC: 1.91 MG/DL (ref 0.55–1.02)
CREAT/UREA NIT SERPL: 13
EOSINOPHIL # BLD AUTO: 0.17 X10(3)/MCL (ref 0–0.9)
EOSINOPHIL NFR BLD AUTO: 1.5 %
ERYTHROCYTE [DISTWIDTH] IN BLOOD BY AUTOMATED COUNT: 13.4 % (ref 11.5–17)
ETHANOL SERPL-MCNC: <10 MG/DL
GFR SERPLBLD CREATININE-BSD FMLA CKD-EPI: 27 ML/MIN/1.73/M2
GLOBULIN SER-MCNC: 2.4 GM/DL (ref 2.4–3.5)
GLUCOSE SERPL-MCNC: 114 MG/DL (ref 82–115)
GLUCOSE UR QL STRIP: NORMAL
HCT VFR BLD AUTO: 31.8 % (ref 37–47)
HGB BLD-MCNC: 10.4 G/DL (ref 12–16)
HGB UR QL STRIP: NEGATIVE
HYALINE CASTS #/AREA URNS LPF: ABNORMAL /LPF
IMM GRANULOCYTES # BLD AUTO: 0.08 X10(3)/MCL (ref 0–0.04)
IMM GRANULOCYTES NFR BLD AUTO: 0.7 %
INR PPP: 1.2
KETONES UR QL STRIP: NEGATIVE
LACTATE SERPL-SCNC: 1.3 MMOL/L (ref 0.5–2.2)
LACTATE SERPL-SCNC: 2.2 MMOL/L (ref 0.5–2.2)
LACTATE SERPL-SCNC: 2.2 MMOL/L (ref 0.5–2.2)
LEUKOCYTE ESTERASE UR QL STRIP: 250
LYMPHOCYTES # BLD AUTO: 1.7 X10(3)/MCL (ref 0.6–4.6)
LYMPHOCYTES NFR BLD AUTO: 14.9 %
MCH RBC QN AUTO: 29.5 PG (ref 27–31)
MCHC RBC AUTO-ENTMCNC: 32.7 G/DL (ref 33–36)
MCV RBC AUTO: 90.1 FL (ref 80–94)
MONOCYTES # BLD AUTO: 1.03 X10(3)/MCL (ref 0.1–1.3)
MONOCYTES NFR BLD AUTO: 9 %
MUCOUS THREADS URNS QL MICRO: ABNORMAL /LPF
NEUTROPHILS # BLD AUTO: 8.35 X10(3)/MCL (ref 2.1–9.2)
NEUTROPHILS NFR BLD AUTO: 73.4 %
NITRITE UR QL STRIP: NEGATIVE
NRBC BLD AUTO-RTO: 0 %
PH UR STRIP: 5.5 [PH]
PLATELET # BLD AUTO: 282 X10(3)/MCL (ref 130–400)
PMV BLD AUTO: 10.8 FL (ref 7.4–10.4)
POTASSIUM SERPL-SCNC: 3.8 MMOL/L (ref 3.5–5.1)
PROT SERPL-MCNC: 5.7 GM/DL (ref 5.8–7.6)
PROT UR QL STRIP: NEGATIVE
PROTHROMBIN TIME: 15.3 SECONDS (ref 12.5–14.5)
RBC # BLD AUTO: 3.53 X10(6)/MCL (ref 4.2–5.4)
RBC #/AREA URNS AUTO: ABNORMAL /HPF
SODIUM SERPL-SCNC: 134 MMOL/L (ref 136–145)
SP GR UR STRIP.AUTO: 1.01 (ref 1–1.03)
SQUAMOUS #/AREA URNS LPF: ABNORMAL /HPF
TROPONIN I SERPL-MCNC: 0.02 NG/ML (ref 0–0.04)
TROPONIN I SERPL-MCNC: <0.01 NG/ML (ref 0–0.04)
UROBILINOGEN UR STRIP-ACNC: NORMAL
WBC # BLD AUTO: 11.39 X10(3)/MCL (ref 4.5–11.5)
WBC #/AREA URNS AUTO: ABNORMAL /HPF

## 2024-10-12 PROCEDURE — 96361 HYDRATE IV INFUSION ADD-ON: CPT

## 2024-10-12 PROCEDURE — 83605 ASSAY OF LACTIC ACID: CPT | Performed by: STUDENT IN AN ORGANIZED HEALTH CARE EDUCATION/TRAINING PROGRAM

## 2024-10-12 PROCEDURE — 84484 ASSAY OF TROPONIN QUANT: CPT | Performed by: STUDENT IN AN ORGANIZED HEALTH CARE EDUCATION/TRAINING PROGRAM

## 2024-10-12 PROCEDURE — 96360 HYDRATION IV INFUSION INIT: CPT

## 2024-10-12 PROCEDURE — 85610 PROTHROMBIN TIME: CPT | Performed by: STUDENT IN AN ORGANIZED HEALTH CARE EDUCATION/TRAINING PROGRAM

## 2024-10-12 PROCEDURE — 99285 EMERGENCY DEPT VISIT HI MDM: CPT | Mod: 25

## 2024-10-12 PROCEDURE — 81001 URINALYSIS AUTO W/SCOPE: CPT | Performed by: STUDENT IN AN ORGANIZED HEALTH CARE EDUCATION/TRAINING PROGRAM

## 2024-10-12 PROCEDURE — 82077 ASSAY SPEC XCP UR&BREATH IA: CPT | Performed by: STUDENT IN AN ORGANIZED HEALTH CARE EDUCATION/TRAINING PROGRAM

## 2024-10-12 PROCEDURE — 87040 BLOOD CULTURE FOR BACTERIA: CPT | Performed by: STUDENT IN AN ORGANIZED HEALTH CARE EDUCATION/TRAINING PROGRAM

## 2024-10-12 PROCEDURE — 80053 COMPREHEN METABOLIC PANEL: CPT | Performed by: STUDENT IN AN ORGANIZED HEALTH CARE EDUCATION/TRAINING PROGRAM

## 2024-10-12 PROCEDURE — 93005 ELECTROCARDIOGRAM TRACING: CPT

## 2024-10-12 PROCEDURE — 83880 ASSAY OF NATRIURETIC PEPTIDE: CPT | Performed by: STUDENT IN AN ORGANIZED HEALTH CARE EDUCATION/TRAINING PROGRAM

## 2024-10-12 PROCEDURE — 93010 ELECTROCARDIOGRAM REPORT: CPT | Mod: ,,, | Performed by: INTERNAL MEDICINE

## 2024-10-12 PROCEDURE — 63600175 PHARM REV CODE 636 W HCPCS: Performed by: STUDENT IN AN ORGANIZED HEALTH CARE EDUCATION/TRAINING PROGRAM

## 2024-10-12 PROCEDURE — 85730 THROMBOPLASTIN TIME PARTIAL: CPT | Performed by: STUDENT IN AN ORGANIZED HEALTH CARE EDUCATION/TRAINING PROGRAM

## 2024-10-12 PROCEDURE — 85025 COMPLETE CBC W/AUTO DIFF WBC: CPT | Performed by: STUDENT IN AN ORGANIZED HEALTH CARE EDUCATION/TRAINING PROGRAM

## 2024-10-12 RX ORDER — CEFDINIR 300 MG/1
300 CAPSULE ORAL DAILY
Qty: 7 CAPSULE | Refills: 0 | Status: SHIPPED | OUTPATIENT
Start: 2024-10-12 | End: 2024-10-19

## 2024-10-12 RX ADMIN — SODIUM CHLORIDE, POTASSIUM CHLORIDE, SODIUM LACTATE AND CALCIUM CHLORIDE 1000 ML: 600; 310; 30; 20 INJECTION, SOLUTION INTRAVENOUS at 01:10

## 2024-10-12 RX ADMIN — SODIUM CHLORIDE, POTASSIUM CHLORIDE, SODIUM LACTATE AND CALCIUM CHLORIDE 1000 ML: 600; 310; 30; 20 INJECTION, SOLUTION INTRAVENOUS at 12:10

## 2024-10-13 LAB
OHS QRS DURATION: 90 MS
OHS QTC CALCULATION: 469 MS

## 2024-10-17 ENCOUNTER — LAB VISIT (OUTPATIENT)
Dept: LAB | Facility: HOSPITAL | Age: 74
End: 2024-10-17
Attending: FAMILY MEDICINE
Payer: MEDICARE

## 2024-10-17 DIAGNOSIS — E11.65 TYPE 2 DIABETES MELLITUS WITH HYPERGLYCEMIA, WITHOUT LONG-TERM CURRENT USE OF INSULIN: ICD-10-CM

## 2024-10-17 DIAGNOSIS — D64.9 ANEMIA, UNSPECIFIED TYPE: ICD-10-CM

## 2024-10-17 DIAGNOSIS — E03.8 OTHER SPECIFIED HYPOTHYROIDISM: ICD-10-CM

## 2024-10-17 DIAGNOSIS — R06.02 SOB (SHORTNESS OF BREATH): ICD-10-CM

## 2024-10-17 PROBLEM — N18.4 CHRONIC KIDNEY DISEASE (CKD), STAGE IV (SEVERE): Status: ACTIVE | Noted: 2024-10-17

## 2024-10-17 LAB
ALBUMIN SERPL-MCNC: 3.9 G/DL (ref 3.4–4.8)
ALBUMIN/GLOB SERPL: 1.3 RATIO (ref 1.1–2)
ALP SERPL-CCNC: 73 UNIT/L (ref 40–150)
ALT SERPL-CCNC: 19 UNIT/L (ref 0–55)
ANION GAP SERPL CALC-SCNC: 10 MEQ/L
AST SERPL-CCNC: 21 UNIT/L (ref 5–34)
BACTERIA BLD CULT: NORMAL
BACTERIA BLD CULT: NORMAL
BASOPHILS # BLD AUTO: 0.03 X10(3)/MCL
BASOPHILS NFR BLD AUTO: 0.3 %
BILIRUB SERPL-MCNC: 0.5 MG/DL
BNP BLD-MCNC: 35.9 PG/ML
BUN SERPL-MCNC: 23.8 MG/DL (ref 9.8–20.1)
CALCIUM SERPL-MCNC: 10 MG/DL (ref 8.4–10.2)
CHLORIDE SERPL-SCNC: 99 MMOL/L (ref 98–107)
CO2 SERPL-SCNC: 29 MMOL/L (ref 23–31)
CREAT SERPL-MCNC: 1.62 MG/DL (ref 0.55–1.02)
CREAT UR-MCNC: 47.7 MG/DL (ref 45–106)
CREAT/UREA NIT SERPL: 15
EOSINOPHIL # BLD AUTO: 0.08 X10(3)/MCL (ref 0–0.9)
EOSINOPHIL NFR BLD AUTO: 0.7 %
ERYTHROCYTE [DISTWIDTH] IN BLOOD BY AUTOMATED COUNT: 13.2 % (ref 11.5–17)
EST. AVERAGE GLUCOSE BLD GHB EST-MCNC: 131.2 MG/DL
FERRITIN SERPL-MCNC: 218.3 NG/ML (ref 4.63–204)
GFR SERPLBLD CREATININE-BSD FMLA CKD-EPI: 33 ML/MIN/1.73/M2
GLOBULIN SER-MCNC: 3.1 GM/DL (ref 2.4–3.5)
GLUCOSE SERPL-MCNC: 121 MG/DL (ref 82–115)
HBA1C MFR BLD: 6.2 %
HCT VFR BLD AUTO: 36.2 % (ref 37–47)
HGB BLD-MCNC: 12.1 G/DL (ref 12–16)
IMM GRANULOCYTES # BLD AUTO: 0.01 X10(3)/MCL (ref 0–0.04)
IMM GRANULOCYTES NFR BLD AUTO: 0.1 %
IRON SATN MFR SERPL: 21 % (ref 20–50)
IRON SERPL-MCNC: 64 UG/DL (ref 50–170)
LYMPHOCYTES # BLD AUTO: 1.64 X10(3)/MCL (ref 0.6–4.6)
LYMPHOCYTES NFR BLD AUTO: 14.9 %
MCH RBC QN AUTO: 29.9 PG (ref 27–31)
MCHC RBC AUTO-ENTMCNC: 33.4 G/DL (ref 33–36)
MCV RBC AUTO: 89.4 FL (ref 80–94)
MICROALBUMIN UR-MCNC: <5 UG/ML
MICROALBUMIN/CREAT RATIO PNL UR: NORMAL
MONOCYTES # BLD AUTO: 0.9 X10(3)/MCL (ref 0.1–1.3)
MONOCYTES NFR BLD AUTO: 8.2 %
NEUTROPHILS # BLD AUTO: 8.36 X10(3)/MCL (ref 2.1–9.2)
NEUTROPHILS NFR BLD AUTO: 75.8 %
PLATELET # BLD AUTO: 304 X10(3)/MCL (ref 130–400)
PMV BLD AUTO: 10.9 FL (ref 7.4–10.4)
POTASSIUM SERPL-SCNC: 4.2 MMOL/L (ref 3.5–5.1)
PROT SERPL-MCNC: 7 GM/DL (ref 5.8–7.6)
RBC # BLD AUTO: 4.05 X10(6)/MCL (ref 4.2–5.4)
SODIUM SERPL-SCNC: 138 MMOL/L (ref 136–145)
T3FREE SERPL-MCNC: 2.89 PG/ML (ref 1.58–3.91)
T4 FREE SERPL-MCNC: 1.11 NG/DL (ref 0.7–1.48)
TIBC SERPL-MCNC: 246 UG/DL (ref 70–310)
TIBC SERPL-MCNC: 310 UG/DL (ref 250–450)
TRANSFERRIN SERPL-MCNC: 283 MG/DL (ref 173–360)
TSH SERPL-ACNC: 0.48 UIU/ML (ref 0.35–4.94)
WBC # BLD AUTO: 11.02 X10(3)/MCL (ref 4.5–11.5)

## 2024-10-17 PROCEDURE — 85025 COMPLETE CBC W/AUTO DIFF WBC: CPT

## 2024-10-17 PROCEDURE — 83036 HEMOGLOBIN GLYCOSYLATED A1C: CPT

## 2024-10-17 PROCEDURE — 82570 ASSAY OF URINE CREATININE: CPT

## 2024-10-17 PROCEDURE — 84443 ASSAY THYROID STIM HORMONE: CPT

## 2024-10-17 PROCEDURE — 82728 ASSAY OF FERRITIN: CPT

## 2024-10-17 PROCEDURE — 83880 ASSAY OF NATRIURETIC PEPTIDE: CPT

## 2024-10-17 PROCEDURE — 83550 IRON BINDING TEST: CPT

## 2024-10-17 PROCEDURE — 84439 ASSAY OF FREE THYROXINE: CPT

## 2024-10-17 PROCEDURE — 36415 COLL VENOUS BLD VENIPUNCTURE: CPT

## 2024-10-17 PROCEDURE — 80053 COMPREHEN METABOLIC PANEL: CPT

## 2024-10-17 PROCEDURE — 83540 ASSAY OF IRON: CPT

## 2024-10-17 PROCEDURE — 82043 UR ALBUMIN QUANTITATIVE: CPT

## 2024-10-17 PROCEDURE — 84481 FREE ASSAY (FT-3): CPT

## 2025-02-23 PROBLEM — I70.0 AORTIC ATHEROSCLEROSIS: Status: RESOLVED | Noted: 2023-07-31 | Resolved: 2025-02-23

## 2025-02-23 PROBLEM — M87.9 KNEE OSTEONECROSIS, RIGHT: Status: RESOLVED | Noted: 2017-09-13 | Resolved: 2025-02-23

## 2025-04-09 ENCOUNTER — LAB VISIT (OUTPATIENT)
Dept: LAB | Facility: HOSPITAL | Age: 75
End: 2025-04-09
Attending: FAMILY MEDICINE
Payer: MEDICARE

## 2025-04-09 DIAGNOSIS — E03.8 OTHER SPECIFIED HYPOTHYROIDISM: ICD-10-CM

## 2025-04-09 DIAGNOSIS — E11.65 TYPE 2 DIABETES MELLITUS WITH HYPERGLYCEMIA, WITHOUT LONG-TERM CURRENT USE OF INSULIN: ICD-10-CM

## 2025-04-09 DIAGNOSIS — R10.11 RIGHT UPPER QUADRANT ABDOMINAL PAIN: ICD-10-CM

## 2025-04-09 DIAGNOSIS — R10.9 RIGHT FLANK PAIN: ICD-10-CM

## 2025-04-09 LAB
BACTERIA #/AREA URNS AUTO: ABNORMAL /HPF
BILIRUB UR QL STRIP.AUTO: NEGATIVE
CLARITY UR: CLEAR
COLOR UR AUTO: ABNORMAL
CREAT SERPL-MCNC: 1.27 MG/DL (ref 0.55–1.02)
CREAT UR-MCNC: 155.2 MG/DL (ref 45–106)
GFR SERPLBLD CREATININE-BSD FMLA CKD-EPI: 44 ML/MIN/1.73/M2
GLUCOSE UR QL STRIP: NEGATIVE
HGB UR QL STRIP: ABNORMAL
KETONES UR QL STRIP: NEGATIVE
LEUKOCYTE ESTERASE UR QL STRIP: ABNORMAL
MICROALBUMIN UR-MCNC: 11.1 UG/ML
MICROALBUMIN/CREAT RATIO PNL UR: 7.2 MG/GM CR (ref 0–30)
NITRITE UR QL STRIP: NEGATIVE
PH UR STRIP: 6 [PH]
PROT UR QL STRIP: NEGATIVE
RBC #/AREA URNS AUTO: ABNORMAL /HPF
SP GR UR STRIP.AUTO: 1.01 (ref 1–1.03)
SQUAMOUS #/AREA URNS AUTO: ABNORMAL /HPF
UROBILINOGEN UR STRIP-ACNC: 0.2
WBC #/AREA URNS AUTO: ABNORMAL /HPF

## 2025-04-09 PROCEDURE — 36415 COLL VENOUS BLD VENIPUNCTURE: CPT

## 2025-04-09 PROCEDURE — 84443 ASSAY THYROID STIM HORMONE: CPT

## 2025-04-09 PROCEDURE — 81001 URINALYSIS AUTO W/SCOPE: CPT

## 2025-04-09 PROCEDURE — 84481 FREE ASSAY (FT-3): CPT

## 2025-04-09 PROCEDURE — 82043 UR ALBUMIN QUANTITATIVE: CPT

## 2025-04-09 PROCEDURE — 82565 ASSAY OF CREATININE: CPT

## 2025-04-09 PROCEDURE — 84439 ASSAY OF FREE THYROXINE: CPT

## 2025-04-09 PROCEDURE — 80053 COMPREHEN METABOLIC PANEL: CPT

## 2025-04-10 LAB
ALBUMIN SERPL-MCNC: 3.3 G/DL (ref 3.4–4.8)
ALBUMIN/GLOB SERPL: 0.9 RATIO (ref 1.1–2)
ALP SERPL-CCNC: 79 UNIT/L (ref 40–150)
ALT SERPL-CCNC: 17 UNIT/L (ref 0–55)
ANION GAP SERPL CALC-SCNC: 8 MEQ/L
AST SERPL-CCNC: 17 UNIT/L (ref 11–45)
BILIRUB SERPL-MCNC: 0.6 MG/DL
BUN SERPL-MCNC: 19.6 MG/DL (ref 9.8–20.1)
CALCIUM SERPL-MCNC: 9.5 MG/DL (ref 8.4–10.2)
CHLORIDE SERPL-SCNC: 104 MMOL/L (ref 98–107)
CO2 SERPL-SCNC: 26 MMOL/L (ref 23–31)
CREAT SERPL-MCNC: 1.2 MG/DL (ref 0.55–1.02)
CREAT/UREA NIT SERPL: 16
GFR SERPLBLD CREATININE-BSD FMLA CKD-EPI: 48 ML/MIN/1.73/M2
GLOBULIN SER-MCNC: 3.8 GM/DL (ref 2.4–3.5)
GLUCOSE SERPL-MCNC: 108 MG/DL (ref 82–115)
POTASSIUM SERPL-SCNC: 4.3 MMOL/L (ref 3.5–5.1)
PROT SERPL-MCNC: 7.1 GM/DL (ref 5.8–7.6)
SODIUM SERPL-SCNC: 138 MMOL/L (ref 136–145)
T3FREE SERPL-MCNC: 2.93 PG/ML (ref 1.58–3.91)
T4 FREE SERPL-MCNC: 1.24 NG/DL (ref 0.7–1.48)
TSH SERPL-ACNC: 0.5 UIU/ML (ref 0.35–4.94)

## 2025-04-11 LAB
BASOPHILS # BLD AUTO: 0.03 X10(3)/MCL
BASOPHILS NFR BLD AUTO: 0.3 %
EOSINOPHIL # BLD AUTO: 0.14 X10(3)/MCL (ref 0–0.9)
EOSINOPHIL NFR BLD AUTO: 1.4 %
ERYTHROCYTE [DISTWIDTH] IN BLOOD BY AUTOMATED COUNT: 14.1 % (ref 11.5–17)
EST. AVERAGE GLUCOSE BLD GHB EST-MCNC: 142.7 MG/DL
HBA1C MFR BLD: 6.6 %
HCT VFR BLD AUTO: 39.6 % (ref 37–47)
HGB BLD-MCNC: 12.3 G/DL (ref 12–16)
IMM GRANULOCYTES # BLD AUTO: 0.06 X10(3)/MCL (ref 0–0.04)
IMM GRANULOCYTES NFR BLD AUTO: 0.6 %
LYMPHOCYTES # BLD AUTO: 2.03 X10(3)/MCL (ref 0.6–4.6)
LYMPHOCYTES NFR BLD AUTO: 19.7 %
MCH RBC QN AUTO: 27.7 PG (ref 27–31)
MCHC RBC AUTO-ENTMCNC: 31.1 G/DL (ref 33–36)
MCV RBC AUTO: 89.2 FL (ref 80–94)
MONOCYTES # BLD AUTO: 0.9 X10(3)/MCL (ref 0.1–1.3)
MONOCYTES NFR BLD AUTO: 8.7 %
NEUTROPHILS # BLD AUTO: 7.15 X10(3)/MCL (ref 2.1–9.2)
NEUTROPHILS NFR BLD AUTO: 69.3 %
PLATELET # BLD AUTO: 353 X10(3)/MCL (ref 130–400)
PMV BLD AUTO: 10.9 FL (ref 7.4–10.4)
RBC # BLD AUTO: 4.44 X10(6)/MCL (ref 4.2–5.4)
WBC # BLD AUTO: 10.31 X10(3)/MCL (ref 4.5–11.5)

## 2025-05-01 ENCOUNTER — HOSPITAL ENCOUNTER (OUTPATIENT)
Dept: RADIOLOGY | Facility: HOSPITAL | Age: 75
Discharge: HOME | End: 2025-05-01
Attending: FAMILY MEDICINE
Payer: MEDICARE

## 2025-05-01 DIAGNOSIS — Z12.31 SCREENING MAMMOGRAM FOR BREAST CANCER: ICD-10-CM

## 2025-05-01 PROCEDURE — 77080 DXA BONE DENSITY AXIAL: CPT | Mod: TC

## 2025-05-01 PROCEDURE — 77063 BREAST TOMOSYNTHESIS BI: CPT | Mod: TC

## 2025-05-07 ENCOUNTER — TELEPHONE (OUTPATIENT)
Dept: RADIOLOGY | Facility: HOSPITAL | Age: 75
End: 2025-05-07
Payer: MEDICARE

## 2025-05-13 ENCOUNTER — HOSPITAL ENCOUNTER (OUTPATIENT)
Dept: RADIOLOGY | Facility: HOSPITAL | Age: 75
Discharge: HOME | End: 2025-05-13
Attending: FAMILY MEDICINE
Payer: MEDICARE

## 2025-05-13 DIAGNOSIS — R92.8 ABNORMAL MAMMOGRAM: ICD-10-CM

## 2025-05-13 PROCEDURE — 77065 DX MAMMO INCL CAD UNI: CPT | Mod: 26,LT,, | Performed by: RADIOLOGY

## 2025-05-13 PROCEDURE — 76642 ULTRASOUND BREAST LIMITED: CPT | Mod: TC,LT

## 2025-05-13 PROCEDURE — 76642 ULTRASOUND BREAST LIMITED: CPT | Mod: 26,LT,, | Performed by: RADIOLOGY

## 2025-05-13 PROCEDURE — 77061 BREAST TOMOSYNTHESIS UNI: CPT | Mod: 26,LT,, | Performed by: RADIOLOGY

## 2025-05-13 PROCEDURE — 77065 DX MAMMO INCL CAD UNI: CPT | Mod: TC,LT

## (undated) DEVICE — BLADE SURG STAINLESS STEEL #15

## (undated) DEVICE — SUPPORT ULNA NERVE PROTECTOR

## (undated) DEVICE — CLOSURE SKIN STERI STRIP 1/2X4

## (undated) DEVICE — WRAP SELF ADH. COBAN 4X5YD

## (undated) DEVICE — Device

## (undated) DEVICE — CORD BIPOLAR 12 FOOT

## (undated) DEVICE — SPONGE LAP STRL 18X18IN

## (undated) DEVICE — KIT ANTIFOG W/SPONG & FLUID

## (undated) DEVICE — SPONGE PATTY SURGICAL .5X3IN

## (undated) DEVICE — SUT 5/0 18IN PLAIN FAST AB

## (undated) DEVICE — BLANKET SNUGGLE WARM LOWER BDY

## (undated) DEVICE — HANDLE DEVON RIGID OR LIGHT

## (undated) DEVICE — INSTRUMENT FRAZIER 10FR W/VENT

## (undated) DEVICE — TIP SUCTION YANKAUER

## (undated) DEVICE — PROTECTOR CORNEAL CROUCH ST

## (undated) DEVICE — STAPLER SKIN PROXIMATE WIDE

## (undated) DEVICE — GOWN X-LG STERILE BACK

## (undated) DEVICE — BANDAGE KERLIX AMD

## (undated) DEVICE — PENCIL ELECSURG ROCKER 15FT

## (undated) DEVICE — DRAPE STERI INSTRUMENT 1018

## (undated) DEVICE — NDL HYPO A BEVEL 30X1/2

## (undated) DEVICE — SOL NORMAL USPCA 0.9%

## (undated) DEVICE — GLOVE PROTEXIS LTX MICRO  7

## (undated) DEVICE — ELECTRODE PATIENT RETURN DISP